# Patient Record
Sex: FEMALE | Race: WHITE | NOT HISPANIC OR LATINO | Employment: STUDENT | URBAN - METROPOLITAN AREA
[De-identification: names, ages, dates, MRNs, and addresses within clinical notes are randomized per-mention and may not be internally consistent; named-entity substitution may affect disease eponyms.]

---
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
## 2024-06-17 ENCOUNTER — LAB (OUTPATIENT)
Dept: LAB | Facility: CLINIC | Age: 19
End: 2024-06-17
Payer: COMMERCIAL

## 2024-06-17 ENCOUNTER — OFFICE VISIT (OUTPATIENT)
Dept: RHEUMATOLOGY | Facility: CLINIC | Age: 19
End: 2024-06-17
Attending: PEDIATRICS
Payer: COMMERCIAL

## 2024-06-17 VITALS
WEIGHT: 179.23 LBS | DIASTOLIC BLOOD PRESSURE: 78 MMHG | BODY MASS INDEX: 28.13 KG/M2 | HEIGHT: 67 IN | SYSTOLIC BLOOD PRESSURE: 116 MMHG

## 2024-06-17 DIAGNOSIS — M32.19 OTHER SYSTEMIC LUPUS ERYTHEMATOSUS WITH OTHER ORGAN INVOLVEMENT (H): Chronic | ICD-10-CM

## 2024-06-17 DIAGNOSIS — Z79.52 CURRENT CHRONIC USE OF SYSTEMIC STEROIDS: ICD-10-CM

## 2024-06-17 DIAGNOSIS — M32.14 LUPUS NEPHRITIS (H): ICD-10-CM

## 2024-06-17 DIAGNOSIS — D84.9 IMMUNOSUPPRESSED STATUS (H): ICD-10-CM

## 2024-06-17 DIAGNOSIS — Z13.5 SCREENING FOR EYE CONDITION: ICD-10-CM

## 2024-06-17 LAB
ALBUMIN SERPL BCG-MCNC: 4.4 G/DL (ref 3.5–5.2)
ALP SERPL-CCNC: 71 U/L (ref 40–150)
ALT SERPL W P-5'-P-CCNC: 16 U/L (ref 0–50)
ANION GAP SERPL CALCULATED.3IONS-SCNC: 9 MMOL/L (ref 7–15)
AST SERPL W P-5'-P-CCNC: 20 U/L (ref 0–45)
BASOPHILS # BLD AUTO: 0 10E3/UL (ref 0–0.2)
BASOPHILS NFR BLD AUTO: 0 %
BILIRUB SERPL-MCNC: 0.2 MG/DL
BUN SERPL-MCNC: 17.2 MG/DL (ref 6–20)
CALCIUM SERPL-MCNC: 9.1 MG/DL (ref 8.6–10)
CHLORIDE SERPL-SCNC: 108 MMOL/L (ref 98–107)
CREAT SERPL-MCNC: 0.92 MG/DL (ref 0.51–0.95)
DEPRECATED HCO3 PLAS-SCNC: 21 MMOL/L (ref 22–29)
EGFRCR SERPLBLD CKD-EPI 2021: >90 ML/MIN/1.73M2
EOSINOPHIL # BLD AUTO: 0.1 10E3/UL (ref 0–0.7)
EOSINOPHIL NFR BLD AUTO: 2 %
ERYTHROCYTE [DISTWIDTH] IN BLOOD BY AUTOMATED COUNT: 12.7 % (ref 10–15)
GLUCOSE SERPL-MCNC: 91 MG/DL (ref 70–99)
HCT VFR BLD AUTO: 41.2 % (ref 35–47)
HGB BLD-MCNC: 12.9 G/DL (ref 11.7–15.7)
IMM GRANULOCYTES # BLD: 0 10E3/UL
IMM GRANULOCYTES NFR BLD: 0 %
LYMPHOCYTES # BLD AUTO: 2.7 10E3/UL (ref 0.8–5.3)
LYMPHOCYTES NFR BLD AUTO: 51 %
MCH RBC QN AUTO: 29.1 PG (ref 26.5–33)
MCHC RBC AUTO-ENTMCNC: 31.3 G/DL (ref 31.5–36.5)
MCV RBC AUTO: 93 FL (ref 78–100)
MONOCYTES # BLD AUTO: 0.4 10E3/UL (ref 0–1.3)
MONOCYTES NFR BLD AUTO: 8 %
NEUTROPHILS # BLD AUTO: 2 10E3/UL (ref 1.6–8.3)
NEUTROPHILS NFR BLD AUTO: 38 %
PLATELET # BLD AUTO: 204 10E3/UL (ref 150–450)
POTASSIUM SERPL-SCNC: 4.4 MMOL/L (ref 3.4–5.3)
PROT SERPL-MCNC: 7 G/DL (ref 6.4–8.3)
RBC # BLD AUTO: 4.44 10E6/UL (ref 3.8–5.2)
SODIUM SERPL-SCNC: 138 MMOL/L (ref 135–145)
WBC # BLD AUTO: 5.2 10E3/UL (ref 4–11)

## 2024-06-17 PROCEDURE — G2211 COMPLEX E/M VISIT ADD ON: HCPCS | Performed by: PEDIATRICS

## 2024-06-17 PROCEDURE — 99214 OFFICE O/P EST MOD 30 MIN: CPT | Performed by: PEDIATRICS

## 2024-06-17 PROCEDURE — 86160 COMPLEMENT ANTIGEN: CPT

## 2024-06-17 PROCEDURE — 85025 COMPLETE CBC W/AUTO DIFF WBC: CPT

## 2024-06-17 PROCEDURE — 36415 COLL VENOUS BLD VENIPUNCTURE: CPT

## 2024-06-17 PROCEDURE — 80053 COMPREHEN METABOLIC PANEL: CPT

## 2024-06-17 PROCEDURE — 86225 DNA ANTIBODY NATIVE: CPT

## 2024-06-17 PROCEDURE — 99213 OFFICE O/P EST LOW 20 MIN: CPT | Performed by: PEDIATRICS

## 2024-06-17 RX ORDER — HYDROXYCHLOROQUINE SULFATE 200 MG/1
400 TABLET, FILM COATED ORAL DAILY
Qty: 180 TABLET | Refills: 4 | Status: SHIPPED | OUTPATIENT
Start: 2024-06-17

## 2024-06-17 RX ORDER — MYCOPHENOLATE MOFETIL 500 MG/1
1000 TABLET ORAL 2 TIMES DAILY
Qty: 360 TABLET | Refills: 4 | Status: SHIPPED | OUTPATIENT
Start: 2024-06-17

## 2024-06-17 NOTE — NURSING NOTE
"Informant-    Julissa is accompanied by mother and father    Reason for Visit-  Follow up    Vitals signs-  /78   Ht 1.712 m (5' 7.4\")   Wt 81.3 kg (179 lb 3.7 oz)   BMI 27.74 kg/m      There are concerns about the child's exposure to violence in the home: No    Need Flu Shot: No    Need MyChart: No    Does the patient need any medication refills today? No    Face to Face time: 5 Minutes  Sammi SY MA      "

## 2024-06-17 NOTE — PATIENT INSTRUCTIONS
Precautions:   Immune Suppression: Routine care for infections and fevers. For fever illness with rash or an illness requiring emergency department or hospital visit, please call our office for advice. No live vaccinations, such as measles mumps rubella (MMR), varicella chickenpox, and intranasal influenza. Inactivated seasonal influenza vaccination is recommended as this patient is in the high-risk group for influenza.    For Patient Education Materials:  [email or web address]/[employer]han [employer]: We encourage you to sign up for [employer] at [email or web address]. For assistance or questions, call [phone]. If your child is 12 years or older, a consent for proxy/parent access needs to be signed so please discuss this with your physician at the next visit.  [facility] [place] Nurse Coordinators: [phone]  help with questions about your child's rheumatic condition, medications, scheduling infusions and test results.    After Hours/Paging : [phone]  For urgent issues after hours or on the weekends, please call the page  ask to speak to the physician on-call for Pediatric Rheumatology. Please do not use [employer] for urgent requests.    Infusions in [place] at [facility]: [phone] - Please try to schedule infusions at least 2 months in advance. Please try to give us 72 hours or longer notice if you need to cancel infusions so other patients can benefit from this opening. Note: Insurance authorization must be obtained before any infusion can be scheduled. If you change health insurance, you must notify our office as soon as possible, so that the infusion can be reauthorized.    [phone],  Main  Services:  Singaporean: [phone], Greenlandic: [phone], Hmong/Sinhala/[place]win: [phone]

## 2024-06-17 NOTE — LETTER
6/17/2024      RE: Julissa Contreras  [address] [facility] [address]     Dear Colleague,    Thank you for the opportunity to participate in the care of your patient, Julissa Contreras, at the [facility] [place] at [facility]. Please see a copy of my visit note below.        [facility] [place]  [address]  [facility] [address]  Phone: [phone]  Fax: [phone]    Patient:  Julissa Contreras, Date of birth 2005  Date of Visit:  06/17/2024  Referring Provider Laquita Chavez    Patient Active Problem List   Diagnosis    Inflammatory arthritis    Other forms of systemic lupus erythematosus (H)    Screening for eye condition    Immunosuppressed status (H24)    Lupus nephritis (H)    Family history of clotting disorder    CHILANGO-1 4G/5G genotype          Rheumatology History:     Julissa was first seen in clinic for evaluation of joint pain and stiffness on 3/27/2019. She had signs of inflammatory arthritis of the right hand, malar rash, and erythematous lesions on the hard palate. Multiple laboratory tests were ordered to help clarify a diagnosis, results were as follows: OH positive, double-stranded DNA antibody positive, See antibody positive and mild positive RNP, positive for hematuria.   4/1/19: Discussed the diagnosis of systemic lupus erythematosus. Started mycophenolate 1000 mg twice daily, hydroxychloroquine 400 mg daily and prednisone 40 mg twice daily in addition to IV methylprednisolone daily for 3 days and then weekly for 4 weeks. Started PJP prophylaxis with bactrim.   4/15/19: Significant improvement in symptoms, continued treatment plan.   5/13/19: Lupus clinically and serologically inactive. Continued to wean prednisone.   6/24/19: Continued to wean prednisone with full discontinuation in July.  8/12/19: Doing well, but hematuria still  present as well as slightly higher protein creatinine ratio. Positive rheumatoid factor which was subsequently negative on repeat testing. Complement and double-stranded DNA antibodies remained normal. Continued with treatment plan.   10/14/19: Doing well off steroids. Continued hematuria and slightly higher protein creatinine ratio. Discussed potential addition of a urinary myoglobin to a future urinalysis.  2/17/20: Lupus in good control. Recommended no changes to treatment plan.  5/11/20: Recent renal biopsy showed active lupus nephritis, mixed class III and class V. restarted corticosteroids. Completed further laboratory testing and discussed potential addition of lisinopril to treatment plan.  9/14/2020.  discussed mild anemia but her lupus overall was doing very well  1/4/2021: She had seen endocrinology who obtained testing for bone health and adrenal insufficiency.  No changes lupus medications.   7/12/2021: No changes made to her treatment plan.  I recommended monitoring every 4 months unless her prednisone is weaned to 0 by nephrology in which case I recommend laboratory tests every 2 months  Eye examination: Screening for eye changes related to hydroxychloroquine should begin after 5 years of use which would be starting January 2024.  Then every year thereafter if she has continuous use..  12/27/2021: Prednisone 5 mg every other day with plans for monthly urinalyses and urine albumin.  Increase glucose monitoring to every other month.  6/20/2022: No active lupus features. Prednisone discontinued February 2022 by nephrology. Recommended monthly urine testing.   12/19/2022: No active lupus.  Infectious screening and immunizations:   Hepatitis B Core K[place]um   Date Value Ref Range Status   04/01/2019 Nonreactive NR^Nonreactive Final     Hepatitis C Antibody   Date Value Ref Range Status   04/01/2019 Nonreactive NR^Nonreactive Final     Comment:     Assay performance characteristics have not been established for  newborns,   infants, and children       Quantiferon-TB Gold Plus Result   Date Value Ref Range Status   04/01/2019 Negative NEG^Negative Final     Comment:     No interferon gamma response to M.tuberculosis antigens was detected.   Infection with M.tuberculosis is unlikely, however a single negative result   does not exclude infection. In patients at high risk for infection, a second   test should be considered  in accordance with the 2017 ATS/IDSA/CDC Clinical Practice Guidelines for   Diagnosis of Tuberculosis in Adults and Children [Bree HIGGINS et   al.Clin.Infect.Dis. 2017 64(2):111-115].            Subjective:   Julissa is a 19 year old female who was seen in Pediatric Rheumatology clinic today for a follow-up visit accompanied today by both parents.  Julissa was last seen in our clinic on 6/19/2023: No signs of active lupus though she continued to have improvement in her urinary protein to creatinine ratio.  We recommended evaluation every 4 to 6 months with laboratory testing and alternating visits with rheumatology and nephrology.  We discussed hydroxychloroquine use and need for formal examination after 5 years of continuous use.    6/17/2024: Family has no particular concerns today.  Julissa is doing quite well, she is remembering all of her medications.  School is going well.  We discussed transition to adult rheumatology.          Allergies:     No Known Allergies       Medications:     Current Outpatient Medications   Medication Sig Dispense Refill    cholecalciferol 50 MCG (2000 UT) tablet Take 1 tablet (50 mcg) by mouth daily 60 tablet 0    hydroxychloroquine (PLAQUENIL) 200 MG tablet Take 2 tablets (400 mg) by mouth daily 180 tablet 4    mycophenolate (GENERIC EQUIVALENT) 500 MG tablet Take 2 tablets (1,000 mg) by mouth 2 times daily 360 tablet 4     No current facility-administered medications for this visit.      No current facility-administered medications for this visit.        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  Elevated CHILANGO-1 levels are associated with an increased risk of CAD and VTE     Past Surgical History:   Procedure Laterality Date    HC BIOPSY RENAL, PERCUTANEOUS  3/3/2020         HC TOOTH EXTRACTION W/FORCEP Bilateral 08/2018    PERCUTANEOUS BIOPSY KIDNEY N/A 3/3/2020    Procedure: Percutaneous Native Kidney Biopsy;  Surgeon: Julissa Cornejo MD;  [facility]      Family History   Problem Relation Age of Onset    Nephrolithiasis Mother     Osteoporosis Maternal Grandmother     Hyperlipidemia Maternal Grandmother     Heart Disease Maternal Grandfather     Pancreatic Cancer Paternal Grandmother     Lymphoma Other     Factor V Leiden deficiency Maternal Aunt     Pancreatic Cancer Paternal Great-Grandmother     Lung Cancer Paternal Grandfather     Rheumatic fever Paternal Grandfather     Fibroids Paternal Aunt     Cancer Maternal Great-Grandfather     Kidney Disease No family hx of      Social History     Social History Narrative    She lives at home with mom and dad. Older sister is in college in [place]. No pets.     Plays soccer, basketball, and volleyball.     7/2021She is thinking going to college in the [employer] and is likely going to study computer science or game theory          Examination:   Blood pressure 116/78, height 1.712 m (5' 7.4\"), weight 81.3 kg (179 lb 3.7 oz), not currently breastfeeding.  Facility age limit for growth %jesus is 20 years.  Growth %ile SmartLinks can only be used for patients less than 20 years old.  Body surface area is 1.97 meters squared.     Constitutional: alert, no distress and cooperative  Head and Eyes: No alopecia, " PEERL, conjunctiva clear  ENT: mucous membranes moist, healthy appearing dentition, no intraoral ulcers and no intranasal ulcers  Neck: Neck supple. No lymphadenopathy. Thyroid symmetric, normal size,  Respiratory: negative, clear to auscultation  Cardiovascular: negative, RRR. No murmurs, no rubs  Gastrointestinal: Abdomen soft, non-tender., No masses, No hepatosplenomegaly  : Deferred  Neurologic: Gait normal.  Sensation grossly normal.  Psychiatric: mentation appears normal and affect normal  Hematologic/Lymphatic/Immunologic: Normal cervical, axillary lymph nodes  Skin: no rashes  Musculoskeletal: gait normal, extremities warm, well perfused. Detailed musculoskeletal exam was performed, normal muscle strength of trunk, upper and lower extremities and no sign of swelling, tenderness at joints or entheses, or decreased ROM unless otherwise noted below.                Last Imaging Results:     Results for orders placed or performed during the hospital encounter of 03/03/20   US Guided Needle Placement ([facility] Only)    Narrative    This exam was marked as non-reportable because it will not be read by a   radiologist or a [facility] non-radiologist provider.                    Last Lab Results:     Lab on 06/17/2024   Component Date Value    WBC Count 06/17/2024 5.2     RBC Count 06/17/2024 4.44     Hemoglobin 06/17/2024 12.9     Hematocrit 06/17/2024 41.2     MCV 06/17/2024 93     MCH 06/17/2024 29.1     MCHC 06/17/2024 31.3 (L)     RDW 06/17/2024 12.7     Platelet Count 06/17/2024 204     % Neutrophils 06/17/2024 38     % Lymphocytes 06/17/2024 51     % Monocytes 06/17/2024 8     % Eosinophils 06/17/2024 2     % Basophils 06/17/2024 0     % Immature Granulocytes 06/17/2024 0     Absolute Neutrophils 06/17/2024 2.0     Absolute Lymphocytes 06/17/2024 2.7     Absolute Monocytes 06/17/2024 0.4     Absolute Eosinophils 06/17/2024 0.1     Absolute Basophils 06/17/2024 0.0     Absolute Immature Granul* 06/17/2024 0.0            Assessment :        Other systemic lupus erythematosus with other organ involvement (H)  Screening for eye condition  Immunosuppressed status (H24)  Current chronic use of systemic steroids    Julissa has no sign of active lupus today based on clinical findings and most of her labs are returned at this time and look quite good.  I look forward to seeing the rest of the results.  Today we discussed transition to adult otology.  Will postpone that for 1 year given that she is still in the midst of finishing college.  Family will contact me though and I think the best option family will contact me for advice when that time comes if they decide to do it before I see her next.  The best option I think is for her to find an rheumatologist in [place] where she plans to stay.         Recommendations and follow-up:     Continue current treatment    Laboratory, Radiology, Referrals: Routine laboratory testing every 4-6 months for lupus monitoring.       No orders of the defined types were placed in this encounter.    Ophthalmology examination: MREYEFREQ: for hydroxchloroquine use, comprehensive eye exam with visual field and OCT, baseline then every 5 years.     Precautions:   Immune Suppression: Routine care for infections and fevers. For fever illness with rash or an illness requiring emergency department or hospital visit, please call our office for advice. No live vaccinations, such as measles mumps rubella (MMR), varicella chickenpox, and intranasal influenza. Inactivated seasonal influenza vaccination is recommended as this patient is in the high-risk group for influenza.  Pregnancy: this patient is on medications that can cause pregnancy loss or birth defects. Patient was cautioned to avoid pregnancy, to hold all medications if she thinks she is pregnant and to notify our office immediately.  Sun Exposure: This patient's medication(s) and/or condition make them sun sensitive, causing skin rash or flare of  symptoms. Sun avoidance and physical and chemical sunblocks are recommended.     Return visit: Return in about 1 year (around 6/17/2025).     If there are any new questions or concerns, I would be glad to help and can be reached through our main office at [phone] or our paging  at [phone].    Lovely Ward MD, MS   of Pediatrics  Pediatric Rheumatology  [facility]    Review of external notes as documented elsewhere in note  Review of the result(s) of each unique test - previous testing  Assessment requiring an independent historian(s) - family - parents  Ordering of each unique test  Prescription drug management  I spent a total of 37 minutes on the day of the visit.   Time spent by me doing chart review, history and exam, documentation and further activities per the note      The longitudinal plan of care for the diagnosis(es)/condition(s) as documented were addressed during this visit. Due to the added complexity in care, I will continue to support Julissa  in the subsequent management and with ongoing continuity of care.    CC  Patient Care Team:  Eleonora Saenz MD as PCP - General (Pediatrics)  Lovely Ward MD as MD (Pediatric Rheumatology)  Segundo Ferrell PA-C Uttley, Scott Arthur, MD as MD (Ophthalmology)  Eleonora Saenz MD as Assigned PCP  Norbert Duffy MD as MD (Pediatric Nephrology)  Lovely Ward MD as Assigned Pediatric Specialist Provider  LOVELY WARD    Copy to patient  Radha Contreras [place]  [address] [facility] [address]

## 2024-06-17 NOTE — PROGRESS NOTES
[facility] PEDIATRIC SPECIALTY CLINIC [place] [address] [address]  [facility] [address]  Phone: [phone]  Fax: [phone]    Patient:  Julissa Contreras, Date of birth 2005  Date of Visit:  06/17/2024  Referring Provider Laquita Chavez    Patient Active Problem List   Diagnosis    Inflammatory arthritis    Other forms of systemic lupus erythematosus (H)    Screening for eye condition    Immunosuppressed status (H24)    Lupus nephritis (H)    Family history of clotting disorder    CHILANGO-1 4G/5G genotype          Rheumatology History:     Julissa was first seen in clinic for evaluation of joint pain and stiffness on 3/27/2019. She had signs of inflammatory arthritis of the right hand, malar rash, and erythematous lesions on the hard palate. Multiple laboratory tests were ordered to help clarify a diagnosis, results were as follows: OH positive, double-stranded DNA antibody positive, See antibody positive and mild positive RNP, positive for hematuria.   4/1/19: Discussed the diagnosis of systemic lupus erythematosus. Started mycophenolate 1000 mg twice daily, hydroxychloroquine 400 mg daily and prednisone 40 mg twice daily in addition to IV methylprednisolone daily for 3 days and then weekly for 4 weeks. Started PJP prophylaxis with bactrim.   4/15/19: Significant improvement in symptoms, continued treatment plan.   5/13/19: Lupus clinically and serologically inactive. Continued to wean prednisone.   6/24/19: Continued to wean prednisone with full discontinuation in July.  8/12/19: Doing well, but hematuria still present as well as slightly higher protein creatinine ratio. Positive rheumatoid factor which was subsequently negative on repeat testing. Complement and double-stranded DNA antibodies remained normal. Continued with treatment plan.   10/14/19: Doing well off steroids. Continued hematuria and slightly higher protein creatinine ratio. Discussed potential addition of a  urinary myoglobin to a future urinalysis.  2/17/20: Lupus in good control. Recommended no changes to treatment plan.  5/11/20: Recent renal biopsy showed active lupus nephritis, mixed class III and class V. restarted corticosteroids. Completed further laboratory testing and discussed potential addition of lisinopril to treatment plan.  9/14/2020.  discussed mild anemia but her lupus overall was doing very well  1/4/2021: She had seen endocrinology who obtained testing for bone health and adrenal insufficiency.  No changes lupus medications.   7/12/2021: No changes made to her treatment plan.  I recommended monitoring every 4 months unless her prednisone is weaned to 0 by nephrology in which case I recommend laboratory tests every 2 months  Eye examination: Screening for eye changes related to hydroxychloroquine should begin after 5 years of use which would be starting January 2024.  Then every year thereafter if she has continuous use..  12/27/2021: Prednisone 5 mg every other day with plans for monthly urinalyses and urine albumin.  Increase glucose monitoring to every other month.  6/20/2022: No active lupus features. Prednisone discontinued February 2022 by nephrology. Recommended monthly urine testing.   12/19/2022: No active lupus.  Infectious screening and immunizations:   Hepatitis B Core Kusum   Date Value Ref Range Status   04/01/2019 Nonreactive NR^Nonreactive Final     Hepatitis C Antibody   Date Value Ref Range Status   04/01/2019 Nonreactive NR^Nonreactive Final     Comment:     Assay performance characteristics have not been established for newborns,   infants, and children       Quantiferon-TB Gold Plus Result   Date Value Ref Range Status   04/01/2019 Negative NEG^Negative Final     Comment:     No interferon gamma response to M.tuberculosis antigens was detected.   Infection with M.tuberculosis is unlikely, however a single negative result   does not exclude infection. In patients at high risk for  infection, a second   test should be considered  in accordance with the 2017 ATS/IDSA/CDC Clinical Practice Guidelines for   Diagnosis of Tuberculosis in Adults and Children [Bree HIGGINS et   al.Clin.Infect.Dis. 2017 64(2):111-115].            Subjective:   Julissa is a 19 year old female who was seen in Pediatric Rheumatology clinic today for a follow-up visit accompanied today by both parents.  Julissa was last seen in our clinic on 6/19/2023: No signs of active lupus though she continued to have improvement in her urinary protein to creatinine ratio.  We recommended evaluation every 4 to 6 months with laboratory testing and alternating visits with rheumatology and nephrology.  We discussed hydroxychloroquine use and need for formal examination after 5 years of continuous use.    6/17/2024: Family has no particular concerns today.  Julissa is doing quite well, she is remembering all of her medications.  School is going well.  We discussed transition to adult rheumatology.          Allergies:     No Known Allergies       Medications:     Current Outpatient Medications   Medication Sig Dispense Refill    cholecalciferol 50 MCG (2000 UT) tablet Take 1 tablet (50 mcg) by mouth daily 60 tablet 0    hydroxychloroquine (PLAQUENIL) 200 MG tablet Take 2 tablets (400 mg) by mouth daily 180 tablet 4    mycophenolate (GENERIC EQUIVALENT) 500 MG tablet Take 2 tablets (1,000 mg) by mouth 2 times daily 360 tablet 4     No current facility-administered medications for this visit.      No current facility-administered medications for this visit.        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  Elevated CHILANGO-1 levels  "are associated with an increased risk of CAD and VTE     Past Surgical History:   Procedure Laterality Date    HC BIOPSY RENAL, PERCUTANEOUS  3/3/2020         HC TOOTH EXTRACTION W/FORCEP Bilateral 08/2018    PERCUTANEOUS BIOPSY KIDNEY N/A 3/3/2020    Procedure: Percutaneous Native Kidney Biopsy;  Surgeon: Julissa Cornejo MD;  Location: [facility]      Family History   Problem Relation Age of Onset    Nephrolithiasis Mother     Osteoporosis Maternal Grandmother     Hyperlipidemia Maternal Grandmother     Heart Disease Maternal Grandfather     Pancreatic Cancer Paternal Grandmother     Lymphoma Other     Factor V Leiden deficiency Maternal Aunt     Pancreatic Cancer Paternal Great-Grandmother     Lung Cancer Paternal Grandfather     Rheumatic fever Paternal Grandfather     Fibroids Paternal Aunt     Cancer Maternal Great-Grandfather     Kidney Disease No family hx of      Social History     Social History Narrative    She lives at home with mom and dad. Older sister is in college in [place]. No pets.     Plays soccer, basketball, and volleyball.     7/2021She is thinking going to college in the [employer] and is likely going to study computer science or game theory          Examination:   Blood pressure 116/78, height 1.712 m (5' 7.4\"), weight 81.3 kg (179 lb 3.7 oz), not currently breastfeeding.  Facility age limit for growth %jesus is 20 years.  Growth %ile SmartLinks can only be used for patients less than 20 years old.  Body surface area is 1.97 meters squared.     Constitutional: alert, no distress and cooperative  Head and Eyes: No alopecia, PEERL, conjunctiva clear  ENT: mucous membranes moist, healthy appearing dentition, no intraoral ulcers and no intranasal ulcers  Neck: Neck supple. No lymphadenopathy. Thyroid symmetric, normal size,  Respiratory: negative, clear to auscultation  Cardiovascular: negative, RRR. No murmurs, no rubs  Gastrointestinal: Abdomen soft, non-tender., No masses, No " hepatosplenomegaly  : Deferred  Neurologic: Gait normal.  Sensation grossly normal.  Psychiatric: mentation appears normal and affect normal  Hematologic/Lymphatic/Immunologic: Normal cervical, axillary lymph nodes  Skin: no rashes  Musculoskeletal: gait normal, extremities warm, well perfused. Detailed musculoskeletal exam was performed, normal muscle strength of trunk, upper and lower extremities and no sign of swelling, tenderness at joints or entheses, or decreased ROM unless otherwise noted below.                Last Imaging Results:     Results for orders placed or performed during the hospital encounter of 03/03/20   US Guided Needle Placement ([facility] Only)    Narrative    This exam was marked as non-reportable because it will not be read by a   radiologist or a [facility] non-radiologist provider.                    Last Lab Results:     Lab on 06/17/2024   Component Date Value    WBC Count 06/17/2024 5.2     RBC Count 06/17/2024 4.44     Hemoglobin 06/17/2024 12.9     Hematocrit 06/17/2024 41.2     MCV 06/17/2024 93     MCH 06/17/2024 29.1     MCHC 06/17/2024 31.3 (L)     RDW 06/17/2024 12.7     Platelet Count 06/17/2024 204     % Neutrophils 06/17/2024 38     % Lymphocytes 06/17/2024 51     % Monocytes 06/17/2024 8     % Eosinophils 06/17/2024 2     % Basophils 06/17/2024 0     % Immature Granulocytes 06/17/2024 0     Absolute Neutrophils 06/17/2024 2.0     Absolute Lymphocytes 06/17/2024 2.7     Absolute Monocytes 06/17/2024 0.4     Absolute Eosinophils 06/17/2024 0.1     Absolute Basophils 06/17/2024 0.0     Absolute Immature Granul* 06/17/2024 0.0           Assessment :        Other systemic lupus erythematosus with other organ involvement (H)  Screening for eye condition  Immunosuppressed status (H24)  Current chronic use of systemic steroids    Julissa has no sign of active lupus today based on clinical findings and most of her labs are returned at this time and look quite good.  I look forward to  seeing the rest of the results.  Today we discussed transition to adult otology.  Will postpone that for 1 year given that she is still in the midst of finishing college.  Family will contact me though and I think the best option family will contact me for advice when that time comes if they decide to do it before I see her next.  The best option I think is for her to find an rheumatologist in [place] where she plans to stay.         Recommendations and follow-up:     Continue current treatment    Laboratory, Radiology, Referrals: Routine laboratory testing every 4-6 months for lupus monitoring.       No orders of the defined types were placed in this encounter.    Ophthalmology examination: MREYEFREQ: for hydroxchloroquine use, comprehensive eye exam with visual field and OCT, baseline then every 5 years.     Precautions:   Immune Suppression: Routine care for infections and fevers. For fever illness with rash or an illness requiring emergency department or hospital visit, please call our office for advice. No live vaccinations, such as measles mumps rubella (MMR), varicella chickenpox, and intranasal influenza. Inactivated seasonal influenza vaccination is recommended as this patient is in the high-risk group for influenza.  Pregnancy: this patient is on medications that can cause pregnancy loss or birth defects. Patient was cautioned to avoid pregnancy, to hold all medications if she thinks she is pregnant and to notify our office immediately.  Sun Exposure: This patient's medication(s) and/or condition make them sun sensitive, causing skin rash or flare of symptoms. Sun avoidance and physical and chemical sunblocks are recommended.     Return visit: Return in about 1 year (around 6/17/2025).     If there are any new questions or concerns, I would be glad to help and can be reached through our main office at [phone] or our paging  at [phone].    Laquita Chavez MD, MS    of Pediatrics  Pediatric Rheumatology  [facility]    Review of external notes as documented elsewhere in note  Review of the result(s) of each unique test - previous testing  Assessment requiring an independent historian(s) - family - parents  Ordering of each unique test  Prescription drug management  I spent a total of 37 minutes on the day of the visit.   Time spent by me doing chart review, history and exam, documentation and further activities per the note      The longitudinal plan of care for the diagnosis(es)/condition(s) as documented were addressed during this visit. Due to the added complexity in care, I will continue to support Julissa  in the subsequent management and with ongoing continuity of care.    CC  Patient Care Team:  Eleonora Saenz MD as PCP - General (Pediatrics)  Lovely Ward MD as MD (Pediatric Rheumatology)  Segundo Ferrell PA-C Uttley, Scott Arthur, MD as MD (Ophthalmology)  Eleonora Saenz MD as Assigned PCP  Norbert Duffy MD as MD (Pediatric Nephrology)  Lovely Ward MD as Assigned Pediatric Specialist Provider  LOVELY WARD    Copy to patient  Radha Contreras James  [address] [facility] [address]

## 2024-06-18 LAB
C3 SERPL-MCNC: 102 MG/DL (ref 81–157)
C4 SERPL-MCNC: 14 MG/DL (ref 13–39)
DSDNA AB SER-ACNC: 16 IU/ML

## 2024-06-19 ENCOUNTER — LAB REQUISITION (OUTPATIENT)
Dept: LAB | Facility: CLINIC | Age: 19
End: 2024-06-19
Payer: COMMERCIAL

## 2024-06-19 DIAGNOSIS — L91.0 HYPERTROPHIC SCAR: ICD-10-CM

## 2024-06-19 PROCEDURE — 88305 TISSUE EXAM BY PATHOLOGIST: CPT | Mod: TC,ORL | Performed by: DERMATOLOGY

## 2024-06-19 PROCEDURE — 88305 TISSUE EXAM BY PATHOLOGIST: CPT | Mod: 26 | Performed by: DERMATOLOGY

## 2024-06-21 LAB
PATH REPORT.COMMENTS IMP SPEC: NORMAL
PATH REPORT.COMMENTS IMP SPEC: NORMAL
PATH REPORT.FINAL DX SPEC: NORMAL
PATH REPORT.GROSS SPEC: NORMAL
PATH REPORT.MICROSCOPIC SPEC OTHER STN: NORMAL
PATH REPORT.RELEVANT HX SPEC: NORMAL

## 2024-06-25 ENCOUNTER — OFFICE VISIT (OUTPATIENT)
Dept: PEDIATRICS | Facility: CLINIC | Age: 19
End: 2024-06-25
Payer: COMMERCIAL

## 2024-06-25 VITALS
WEIGHT: 176.7 LBS | TEMPERATURE: 97.8 F | HEART RATE: 63 BPM | SYSTOLIC BLOOD PRESSURE: 102 MMHG | HEIGHT: 68 IN | OXYGEN SATURATION: 100 % | BODY MASS INDEX: 26.78 KG/M2 | RESPIRATION RATE: 18 BRPM | DIASTOLIC BLOOD PRESSURE: 70 MMHG

## 2024-06-25 DIAGNOSIS — M19.90 INFLAMMATORY ARTHRITIS: ICD-10-CM

## 2024-06-25 DIAGNOSIS — Z00.00 ROUTINE GENERAL MEDICAL EXAMINATION AT A HEALTH CARE FACILITY: Primary | ICD-10-CM

## 2024-06-25 DIAGNOSIS — Z83.2 FAMILY HISTORY OF CLOTTING DISORDER: ICD-10-CM

## 2024-06-25 DIAGNOSIS — Z15.89 PAI-1 4G/5G GENOTYPE: ICD-10-CM

## 2024-06-25 DIAGNOSIS — D84.9 IMMUNOSUPPRESSED STATUS (H): ICD-10-CM

## 2024-06-25 DIAGNOSIS — M32.14 LUPUS NEPHRITIS (H): ICD-10-CM

## 2024-06-25 LAB
ALBUMIN UR-MCNC: NEGATIVE MG/DL
APPEARANCE UR: CLEAR
BACTERIA #/AREA URNS HPF: ABNORMAL /HPF
BILIRUB UR QL STRIP: NEGATIVE
COLOR UR AUTO: YELLOW
GLUCOSE UR STRIP-MCNC: NEGATIVE MG/DL
HGB UR QL STRIP: NEGATIVE
KETONES UR STRIP-MCNC: NEGATIVE MG/DL
LEUKOCYTE ESTERASE UR QL STRIP: NEGATIVE
MUCOUS THREADS #/AREA URNS LPF: PRESENT /LPF
NITRATE UR QL: NEGATIVE
PH UR STRIP: 6 [PH] (ref 5–7)
RBC #/AREA URNS AUTO: ABNORMAL /HPF
SP GR UR STRIP: >=1.03 (ref 1–1.03)
SQUAMOUS #/AREA URNS AUTO: ABNORMAL /LPF
UROBILINOGEN UR STRIP-ACNC: 0.2 E.U./DL
WBC #/AREA URNS AUTO: ABNORMAL /HPF

## 2024-06-25 PROCEDURE — 99395 PREV VISIT EST AGE 18-39: CPT | Performed by: PEDIATRICS

## 2024-06-25 PROCEDURE — 84156 ASSAY OF PROTEIN URINE: CPT

## 2024-06-25 PROCEDURE — 81001 URINALYSIS AUTO W/SCOPE: CPT

## 2024-06-25 SDOH — HEALTH STABILITY: PHYSICAL HEALTH: ON AVERAGE, HOW MANY MINUTES DO YOU ENGAGE IN EXERCISE AT THIS LEVEL?: 70 MIN

## 2024-06-25 SDOH — HEALTH STABILITY: PHYSICAL HEALTH: ON AVERAGE, HOW MANY DAYS PER WEEK DO YOU ENGAGE IN MODERATE TO STRENUOUS EXERCISE (LIKE A BRISK WALK)?: 5 DAYS

## 2024-06-25 ASSESSMENT — SOCIAL DETERMINANTS OF HEALTH (SDOH): HOW OFTEN DO YOU GET TOGETHER WITH FRIENDS OR RELATIVES?: MORE THAN THREE TIMES A WEEK

## 2024-06-25 ASSESSMENT — PAIN SCALES - GENERAL: PAINLEVEL: NO PAIN (0)

## 2024-06-25 NOTE — PATIENT INSTRUCTIONS
"Patient Education   Preventive Care Advice   This is general advice we often give to help people stay healthy. Your care team may have specific advice just for you. Please talk to your care team about your own preventive care needs.  Lifestyle  Exercise at least 150 minutes each week (30 minutes a day, 5 days a week).  Do muscle strengthening activities 2 days a week. These help control your weight and prevent disease.  No smoking.  Wear sunscreen to prevent skin cancer.  Have your home tested for radon every 2 to 5 years. Radon is a colorless, odorless gas that can harm your lungs. To learn more, go to www.health.[facility].mn.us and search for \"Radon in Homes.\"  Keep guns unloaded and locked up in a safe place like a safe or gun vault, or, use a gun lock and hide the keys. Always lock away bullets separately. To learn more, visit [employer].mn.gov and search for \"safe gun storage.\"  Nutrition  Eat 5 or more servings of fruits and vegetables each day.  Try wheat bread, brown rice and whole grain pasta (instead of white bread, rice, and pasta).  Get enough calcium and vitamin D. Check the label on foods and aim for 100% of the RDA (recommended daily allowance).  Regular exams  Have a dental exam and cleaning every 6 months.  See your health care team every year to talk about:  Any changes in your health.  Any medicines your care team has prescribed.  Preventive care, family planning, and ways to prevent chronic diseases.  Shots (vaccines)   HPV shots (up to age 26), if you've never had them before.  Hepatitis B shots (up to age 59), if you've never had them before.  COVID-19 shot: Get this shot when it's due.  Flu shot: Get a flu shot every year.  Tetanus shot: Get a tetanus shot every 10 years.  Pneumococcal, hepatitis A, and RSV shots: Ask your care team if you need these based on your risk.  Shingles shot (for age 50 and up).  General health tests  Diabetes screening:  Starting at age 35, Get screened for diabetes at least " every 3 years.  If you are younger than age 35, ask your care team if you should be screened for diabetes.  Cholesterol test: At age 39, start having a cholesterol test every 5 years, or more often if advised.  Bone density scan (DEXA): At age 50, ask your care team if you should have this scan for osteoporosis (brittle bones).  Hepatitis C: Get tested at least once in your life.  Abdominal aortic aneurysm screening: Talk to your doctor about having this screening if you:  Have ever smoked; and  Are biologically male; and  Are between the ages of 65 and 75.  STIs (sexually transmitted infections)  Before age 24: Ask your care team if you should be screened for STIs.  After age 24: Get screened for STIs if you're at risk. You are at risk for STIs (including HIV) if:  You are sexually active with more than one person.  You don't use condoms every time.  You or a partner was diagnosed with a sexually transmitted infection.  If you are at risk for HIV, ask about PrEP medicine to prevent HIV.  Get tested for HIV at least once in your life, whether you are at risk for HIV or not.  Cancer screening tests  Cervical cancer screening: If you have a cervix, begin getting regular cervical cancer screening tests at age 21. Most people who have regular screenings with normal results can stop after age 65. Talk about this with your provider.  Breast cancer scan (mammogram): If you've ever had breasts, begin having regular mammograms starting at age 40. This is a scan to check for breast cancer.  Colon cancer screening: It is important to start screening for colon cancer at age 45.  Have a colonoscopy test every 10 years (or more often if you're at risk) Or, ask your provider about stool tests like a FIT test every year or Cologuard test every 3 years.  To learn more about your testing options, visit: www.[employer]/480928.pdf.  For help making a decision, visit: david/bm39144.  Prostate cancer screening test: If you have a  prostate and are age 55 to 69, ask your provider if you would benefit from a yearly prostate cancer screening test.  Lung cancer screening: If you are a current or former smoker age 50 to 80, ask your care team if ongoing lung cancer screenings are right for you.  For informational purposes only. Not to replace the advice of your health care provider. Copyright   2023 [facility]. All rights reserved. Clinically reviewed by the [facility] Transitions Program. [employer] [id number] - REV 04/24.

## 2024-06-25 NOTE — PROGRESS NOTES
"Preventive Care Visit  [facility] DELILAH Saenz MD, Internal Medicine - Pediatrics  Jun 25, 2024      Assessment & Plan       ICD-10-CM    1. Routine general medical examination at a health care facility  Z00.00 PRIMARY CARE FOLLOW-UP SCHEDULING    Recent labs from rheumatology reviewed, repeating some urine tests today.     Immunizations and screenings UTD      2. CHILANGO-1 4G/5G genotype  Z15.89 Higher risk for clot, would not be candidate for estrogen containing products      3. Lupus nephritis (H)  M32.14 Greatly appreciate care from Dr Chavez - on stable medications and doing well      4. Inflammatory arthritis  M19.90 Doing well, staying very fit      5. Family history of clotting disorder  Z83.2 As above, no estrogen containing products in the future      6. Immunosuppressed status (H24)  D84.9 UTD on vaccines                  BMI  Estimated body mass index is 27.09 kg/m  as calculated from the following:    Height as of this encounter: 1.72 m (5' 7.72\").    Weight as of this encounter: 80.2 kg (176 lb 11.2 oz).   Weight management plan: Discussed healthy diet and exercise guidelines    Counseling  Appropriate preventive services were discussed with this patient, including applicable screening as appropriate for fall prevention, nutrition, physical activity, Tobacco-use cessation, weight loss and cognition.  Checklist reviewing preventive services available has been given to the patient.  Reviewed patient's diet, addressing concerns and/or questions.       Frankie Costa is a 19 year old, presenting for the following:  Physical        6/25/2024    10:50 AM   Additional Questions   Roomed by Peyton Quezada   Accompanied by IFRAH        Health Care Directive  Patient does not have a Health Care Directive or Living Will: Discussed advance care planning with patient; information given to patient to review.    HPI        6/25/2024   General Health   How would you rate your overall physical " health? Excellent   Feel stress (tense, anxious, or unable to sleep) Not at all            6/25/2024   Nutrition   Three or more servings of calcium each day? Yes   Diet: Regular (no restrictions)   How many servings of fruit and vegetables per day? (!) 0-1   How many sweetened beverages each day? 0-1            6/25/2024   Exercise   Days per week of moderate/strenous exercise 5 days   Average minutes spent exercising at this level 70 min            6/25/2024   Social Factors   Frequency of gathering with friends or relatives More than three times a week   Worry food won't last until get money to buy more No   Food not last or not have enough money for food? No   Do you have housing? (Housing is defined as stable permanent housing and does not include staying ouside in a car, in a tent, in an abandoned building, in an overnight shelter, or couch-surfing.) Yes   Are you worried about losing your housing? No   Lack of transportation? No   Unable to get utilities (heat,electricity)? No            6/25/2024   Dental   Dentist two times every year? Yes               Today's PHQ-2 Score:       6/25/2024    10:46 AM   PHQ-2 ( 1999 [employer])   Q1: Little interest or pleasure in doing things 0   Q2: Feeling down, depressed or hopeless 0   PHQ-2 Score 0   Q1: Little interest or pleasure in doing things Not at all   Q2: Feeling down, depressed or hopeless Not at all   PHQ-2 Score 0           6/25/2024   Substance Use   Alcohol more than 3/day or more than 7/wk No   Do you use any other substances recreationally? (!) ALCOHOL        Social History     Tobacco Use    Smoking status: Never    Smokeless tobacco: Never   Vaping Use    Vaping status: Never Used   Substance Use Topics    Alcohol use: Never    Drug use: Never           6/25/2024   STI Screening   New sexual partner(s) since last STI/HIV test? No        History of abnormal Pap smear: No - under age 21, PAP not appropriate for age             6/25/2024  "  Contraception/Family Planning   Questions about contraception or family planning No              Reviewed and updated as needed this visit by Provider                        Review of Systems   ROS: 10 point ROS neg other than the symptoms noted above in the HPI.       Objective    Exam  /70 (BP Location: Right arm, Patient Position: Sitting, Cuff Size: Adult Regular)   Pulse 63   Temp 97.8  F (36.6  C) (Tympanic)   Resp 18   Ht 1.72 m (5' 7.72\")   Wt 80.2 kg (176 lb 11.2 oz)   LMP 06/23/2024   SpO2 100%   BMI 27.09 kg/m     Estimated body mass index is 27.09 kg/m  as calculated from the following:    Height as of this encounter: 1.72 m (5' 7.72\").    Weight as of this encounter: 80.2 kg (176 lb 11.2 oz).    Physical Exam  GENERAL: alert and no distress  EYES: Eyes grossly normal to inspection, PERRL and conjunctivae and sclerae normal  HENT: ear canals and TM's normal, nose and mouth without ulcers or lesions  NECK: no adenopathy, no asymmetry, masses, or scars  RESP: lungs clear to auscultation - no rales, rhonchi or wheezes  CV: regular rate and rhythm, normal S1 S2, no S3 or S4, no murmur, click or rub, no peripheral edema  ABDOMEN: soft, nontender, no hepatosplenomegaly, no masses and bowel sounds normal  MS: no gross musculoskeletal defects noted, no edema  SKIN: no suspicious lesions or rashes  NEURO: Normal strength and tone, mentation intact and speech normal  PSYCH: mentation appears normal, affect normal/bright  : Exam declined by parent/patient.  Reason for decline: Patient/Parental preference      Vision Screen       Hearing Screen           Signed Electronically by: Eleonora Saenz MD    "

## 2024-06-26 ENCOUNTER — TELEPHONE (OUTPATIENT)
Dept: NEPHROLOGY | Facility: CLINIC | Age: 19
End: 2024-06-26
Payer: COMMERCIAL

## 2024-06-26 LAB
ALBUMIN MFR UR ELPH: 11.2 MG/DL
CREAT UR-MCNC: 267 MG/DL
PROT/CREAT 24H UR: 0.04 MG/MG CR (ref 0–0.2)

## 2024-06-26 NOTE — TELEPHONE ENCOUNTER
[facility] Call Center    Phone Message    May a detailed message be left on voicemail: yes     Reason for Call: Other: patient's mother called stating patient will only be available during the dates 12/16/24-01/03/25 for a follow up as that is when she will be back from college. Patient's mother wants to know if there is any way patient can see Dr. Duffy during that timeframe and would like a call back to discuss options moving forward, Please.      Action Taken: Other: PEDS NEPH    Travel Screening: Not Applicable     Date of Service: 06/26/24

## 2024-07-08 NOTE — TELEPHONE ENCOUNTER
Called and spoke with mom and given dates of 12/11/24 or 1/8/25 at [facility], but Juilssa will still either be at school for finals or returning back to school by that time.  Mom is wondering if follow-up in December can be with Dr. Chavez and in June when Julissa returns, she can have her final follow up with Dr. Norbert Duffy in Peds Nephrology before transferring to adult.  Msg sent to [facility]/  for update.    Dione Link  Pediatric Nephrology/ Neph Genetic Clinic  Sr. Patient Coordinator/ Sr. Complex Referral Specialist  [facility]/ [facility]

## 2024-07-10 NOTE — TELEPHONE ENCOUNTER
LM w/ writer's direct call back information to schedule follow-up appt w/ Dr. Norbert Duffy in June 2025 & schedule follow-up w/ Dr. Laquita Chavez in Dec 2024 to accommodate patient's school schedule.    Dione Link  Pediatric Nephrology/ Neph Genetic Clinic  Sr. Patient Coordinator/ Sr. Complex Referral Specialist  [facility]/ [facility]

## 2024-07-11 NOTE — TELEPHONE ENCOUNTER
Pt scheduled w/ Dr. Laquita Chavez on Monday, 12/30/24 @ 3.40pm- [facility] & scheduled w/ Dr. Norbert Duffy on Wednesday, 6/18/25 @ 3pm for final follow ups before transitioning to adult.  Confirmed appt time/ dates/ locations/ providers w/ Radha, pt's mother.    Dione Link  Pediatric Nephrology/ Neph Genetic Clinic  Sr. Patient Coordinator/ Sr. Complex Referral Specialist  [facility]/ [facility]

## 2024-08-28 ENCOUNTER — LAB (OUTPATIENT)
Dept: LAB | Facility: CLINIC | Age: 19
End: 2024-08-28
Payer: COMMERCIAL

## 2024-08-28 DIAGNOSIS — M32.14 LUPUS NEPHRITIS (H): ICD-10-CM

## 2024-08-28 LAB
ALBUMIN MFR UR ELPH: 6.3 MG/DL
ALBUMIN SERPL BCG-MCNC: 4.2 G/DL (ref 3.5–5.2)
ALBUMIN UR-MCNC: NEGATIVE MG/DL
ALP SERPL-CCNC: 63 U/L (ref 40–150)
ALT SERPL W P-5'-P-CCNC: 12 U/L (ref 0–50)
ANION GAP SERPL CALCULATED.3IONS-SCNC: 11 MMOL/L (ref 7–15)
APPEARANCE UR: CLEAR
AST SERPL W P-5'-P-CCNC: 18 U/L (ref 0–45)
BACTERIA #/AREA URNS HPF: ABNORMAL /HPF
BASOPHILS # BLD AUTO: 0 10E3/UL (ref 0–0.2)
BASOPHILS NFR BLD AUTO: 1 %
BILIRUB SERPL-MCNC: <0.2 MG/DL
BILIRUB UR QL STRIP: NEGATIVE
BUN SERPL-MCNC: 9.1 MG/DL (ref 6–20)
CALCIUM SERPL-MCNC: 9.1 MG/DL (ref 8.8–10.4)
CHLORIDE SERPL-SCNC: 107 MMOL/L (ref 98–107)
COLOR UR AUTO: YELLOW
CREAT SERPL-MCNC: 0.94 MG/DL (ref 0.51–0.95)
CREAT UR-MCNC: 127 MG/DL
EGFRCR SERPLBLD CKD-EPI 2021: 89 ML/MIN/1.73M2
EOSINOPHIL # BLD AUTO: 0.1 10E3/UL (ref 0–0.7)
EOSINOPHIL NFR BLD AUTO: 3 %
ERYTHROCYTE [DISTWIDTH] IN BLOOD BY AUTOMATED COUNT: 12.9 % (ref 10–15)
GLUCOSE SERPL-MCNC: 91 MG/DL (ref 70–99)
GLUCOSE UR STRIP-MCNC: NEGATIVE MG/DL
HCO3 SERPL-SCNC: 22 MMOL/L (ref 22–29)
HCT VFR BLD AUTO: 40.8 % (ref 35–47)
HGB BLD-MCNC: 12.7 G/DL (ref 11.7–15.7)
HGB UR QL STRIP: NEGATIVE
IMM GRANULOCYTES # BLD: 0 10E3/UL
IMM GRANULOCYTES NFR BLD: 0 %
KETONES UR STRIP-MCNC: ABNORMAL MG/DL
LEUKOCYTE ESTERASE UR QL STRIP: NEGATIVE
LYMPHOCYTES # BLD AUTO: 1.8 10E3/UL (ref 0.8–5.3)
LYMPHOCYTES NFR BLD AUTO: 44 %
MCH RBC QN AUTO: 28.6 PG (ref 26.5–33)
MCHC RBC AUTO-ENTMCNC: 31.1 G/DL (ref 31.5–36.5)
MCV RBC AUTO: 92 FL (ref 78–100)
MONOCYTES # BLD AUTO: 0.7 10E3/UL (ref 0–1.3)
MONOCYTES NFR BLD AUTO: 16 %
MUCOUS THREADS #/AREA URNS LPF: PRESENT /LPF
NEUTROPHILS # BLD AUTO: 1.4 10E3/UL (ref 1.6–8.3)
NEUTROPHILS NFR BLD AUTO: 36 %
NITRATE UR QL: NEGATIVE
PH UR STRIP: 5.5 [PH] (ref 5–7)
PLATELET # BLD AUTO: 177 10E3/UL (ref 150–450)
POTASSIUM SERPL-SCNC: 4.4 MMOL/L (ref 3.4–5.3)
PROT SERPL-MCNC: 6.7 G/DL (ref 6.4–8.3)
PROT/CREAT 24H UR: 0.05 MG/MG CR (ref 0–0.2)
RBC # BLD AUTO: 4.44 10E6/UL (ref 3.8–5.2)
RBC #/AREA URNS AUTO: ABNORMAL /HPF
SODIUM SERPL-SCNC: 140 MMOL/L (ref 135–145)
SP GR UR STRIP: 1.02 (ref 1–1.03)
SQUAMOUS #/AREA URNS AUTO: ABNORMAL /LPF
UROBILINOGEN UR STRIP-ACNC: 0.2 E.U./DL
WBC # BLD AUTO: 4 10E3/UL (ref 4–11)
WBC #/AREA URNS AUTO: ABNORMAL /HPF

## 2024-08-28 PROCEDURE — 86225 DNA ANTIBODY NATIVE: CPT

## 2024-08-28 PROCEDURE — 80053 COMPREHEN METABOLIC PANEL: CPT

## 2024-08-28 PROCEDURE — 81001 URINALYSIS AUTO W/SCOPE: CPT

## 2024-08-28 PROCEDURE — 86160 COMPLEMENT ANTIGEN: CPT

## 2024-08-28 PROCEDURE — 36415 COLL VENOUS BLD VENIPUNCTURE: CPT

## 2024-08-28 PROCEDURE — 84156 ASSAY OF PROTEIN URINE: CPT

## 2024-08-28 PROCEDURE — 85025 COMPLETE CBC W/AUTO DIFF WBC: CPT

## 2024-08-29 LAB
C3 SERPL-MCNC: 97 MG/DL (ref 81–157)
C4 SERPL-MCNC: 17 MG/DL (ref 13–39)
DSDNA AB SER-ACNC: 18 IU/ML

## 2024-11-26 ENCOUNTER — TRANSFERRED RECORDS (OUTPATIENT)
Dept: HEALTH INFORMATION MANAGEMENT | Facility: CLINIC | Age: 19
End: 2024-11-26
Payer: COMMERCIAL

## 2024-12-11 DIAGNOSIS — M32.14 OTHER SYSTEMIC LUPUS ERYTHEMATOSUS WITH GLOMERULAR DISEASE (H): Primary | Chronic | ICD-10-CM

## 2024-12-23 ENCOUNTER — LAB (OUTPATIENT)
Dept: LAB | Facility: CLINIC | Age: 19
End: 2024-12-23
Payer: COMMERCIAL

## 2024-12-23 DIAGNOSIS — M32.14 OTHER SYSTEMIC LUPUS ERYTHEMATOSUS WITH GLOMERULAR DISEASE (H): ICD-10-CM

## 2024-12-23 LAB
ALBUMIN MFR UR ELPH: 8.4 MG/DL
ALBUMIN SERPL BCG-MCNC: 3.9 G/DL (ref 3.5–5.2)
ALBUMIN UR-MCNC: ABNORMAL MG/DL
ALP SERPL-CCNC: 61 U/L (ref 40–150)
ALT SERPL W P-5'-P-CCNC: 15 U/L (ref 0–50)
ANION GAP SERPL CALCULATED.3IONS-SCNC: 8 MMOL/L (ref 7–15)
APPEARANCE UR: CLEAR
AST SERPL W P-5'-P-CCNC: 20 U/L (ref 0–45)
BACTERIA #/AREA URNS HPF: ABNORMAL /HPF
BASOPHILS # BLD AUTO: 0 10E3/UL (ref 0–0.2)
BASOPHILS NFR BLD AUTO: 0 %
BILIRUB SERPL-MCNC: 0.2 MG/DL
BILIRUB UR QL STRIP: NEGATIVE
BUN SERPL-MCNC: 12.2 MG/DL (ref 6–20)
CALCIUM SERPL-MCNC: 9.2 MG/DL (ref 8.8–10.4)
CHLORIDE SERPL-SCNC: 106 MMOL/L (ref 98–107)
COLOR UR AUTO: YELLOW
CREAT SERPL-MCNC: 0.86 MG/DL (ref 0.51–0.95)
CREAT UR-MCNC: 178 MG/DL
EGFRCR SERPLBLD CKD-EPI 2021: >90 ML/MIN/1.73M2
EOSINOPHIL # BLD AUTO: 0.1 10E3/UL (ref 0–0.7)
EOSINOPHIL NFR BLD AUTO: 2 %
ERYTHROCYTE [DISTWIDTH] IN BLOOD BY AUTOMATED COUNT: 12.8 % (ref 10–15)
GLUCOSE SERPL-MCNC: 96 MG/DL (ref 70–99)
GLUCOSE UR STRIP-MCNC: NEGATIVE MG/DL
HCO3 SERPL-SCNC: 25 MMOL/L (ref 22–29)
HCT VFR BLD AUTO: 39.6 % (ref 35–47)
HGB BLD-MCNC: 12.2 G/DL (ref 11.7–15.7)
HGB UR QL STRIP: NEGATIVE
IMM GRANULOCYTES # BLD: 0 10E3/UL
IMM GRANULOCYTES NFR BLD: 1 %
KETONES UR STRIP-MCNC: NEGATIVE MG/DL
LEUKOCYTE ESTERASE UR QL STRIP: NEGATIVE
LYMPHOCYTES # BLD AUTO: 3 10E3/UL (ref 0.8–5.3)
LYMPHOCYTES NFR BLD AUTO: 46 %
MCH RBC QN AUTO: 28.4 PG (ref 26.5–33)
MCHC RBC AUTO-ENTMCNC: 30.8 G/DL (ref 31.5–36.5)
MCV RBC AUTO: 92 FL (ref 78–100)
MONOCYTES # BLD AUTO: 0.5 10E3/UL (ref 0–1.3)
MONOCYTES NFR BLD AUTO: 8 %
NEUTROPHILS # BLD AUTO: 2.9 10E3/UL (ref 1.6–8.3)
NEUTROPHILS NFR BLD AUTO: 43 %
NITRATE UR QL: NEGATIVE
PH UR STRIP: 6.5 [PH] (ref 5–7)
PLATELET # BLD AUTO: 206 10E3/UL (ref 150–450)
POTASSIUM SERPL-SCNC: 4.1 MMOL/L (ref 3.4–5.3)
PROT SERPL-MCNC: 6.4 G/DL (ref 6.4–8.3)
PROT/CREAT 24H UR: 0.05 MG/MG CR (ref 0–0.2)
RBC # BLD AUTO: 4.3 10E6/UL (ref 3.8–5.2)
RBC #/AREA URNS AUTO: ABNORMAL /HPF
SODIUM SERPL-SCNC: 139 MMOL/L (ref 135–145)
SP GR UR STRIP: >=1.03 (ref 1–1.03)
SQUAMOUS #/AREA URNS AUTO: ABNORMAL /LPF
UROBILINOGEN UR STRIP-ACNC: 0.2 E.U./DL
WBC # BLD AUTO: 6.6 10E3/UL (ref 4–11)
WBC #/AREA URNS AUTO: ABNORMAL /HPF

## 2024-12-23 PROCEDURE — 86225 DNA ANTIBODY NATIVE: CPT

## 2024-12-23 PROCEDURE — 85025 COMPLETE CBC W/AUTO DIFF WBC: CPT

## 2024-12-23 PROCEDURE — 36415 COLL VENOUS BLD VENIPUNCTURE: CPT

## 2024-12-23 PROCEDURE — 80053 COMPREHEN METABOLIC PANEL: CPT

## 2024-12-23 PROCEDURE — 81001 URINALYSIS AUTO W/SCOPE: CPT

## 2024-12-23 PROCEDURE — 86160 COMPLEMENT ANTIGEN: CPT

## 2024-12-23 PROCEDURE — 84156 ASSAY OF PROTEIN URINE: CPT

## 2024-12-24 LAB
C3 SERPL-MCNC: 94 MG/DL (ref 81–157)
C4 SERPL-MCNC: 14 MG/DL (ref 13–39)
DSDNA AB SER-ACNC: 16 IU/ML

## 2024-12-29 PROBLEM — M19.90 INFLAMMATORY ARTHRITIS: Status: RESOLVED | Noted: 2019-03-27 | Resolved: 2024-12-29

## 2024-12-29 PROBLEM — Z15.89 PAI-1 4G/5G GENOTYPE: Chronic | Status: ACTIVE | Noted: 2020-02-10

## 2024-12-29 PROBLEM — M32.14 LUPUS NEPHRITIS (H): Chronic | Status: ACTIVE | Noted: 2020-01-07

## 2024-12-29 PROBLEM — Z83.2 FAMILY HISTORY OF CLOTTING DISORDER: Chronic | Status: ACTIVE | Noted: 2020-02-04

## 2024-12-29 NOTE — PATIENT INSTRUCTIONS
"Continue current treatment.   Basic labs in April o r may if not in [place] , full labs and appointment in May or June if in [place]    Precautions:   Immune Suppression: Routine care for infections and fevers. For fever illness with rash or an illness requiring emergency department or hospital visit, please call our office for advice. No live vaccinations, such as measles mumps rubella (MMR), varicella chickenpox, and intranasal influenza. Inactivated seasonal influenza and COVID vaccination is recommended as this patient is in the high-risk group for influenza.  Methotrexate: Infections: Hold for \"Mono\" (Poncho-Barr Virus, EBV), chicken pox, or \"shingles\" (herpes zoster). Medication interactions: Avoid antibiotics which contain trimethoprim (sulfamethoxazole/trimethoprim; trade names: Bactrim or Septra). can be used with naproxen and  other NSAIDS    Important updates to our practice:   Arrival time is 15 minutes before appointment time -- Dr. Chavez will start your visit at your appointment time. Please be early  Medication Refill: We will not be able to provide refills between appointments. A prescription with enough refills until one month after your next scheduled visit will be provided today. Your are responsible for any recommended lab monitoring tests before your next visit.  There is no staff to call you for appointments so it is your responsibility to schedule and arrive at your appointment.     For Patient Education Materials:  [email or web address]/[email or web address]       [facility] [place] Nurse Coordinators: [phone] or by [facility] to help with questions about your child's rheumatic condition    After Hours/Paging : [phone]  For urgent issues after hours or on the weekends, please call the page  ask to speak to the physician on-call for Pediatric Rheumatology. Please do not use [employer] for urgent requests.    Infusions in [place] at [facility]: " [phone].       [phone],  Main  Services:  Turks and Caicos Islander: [phone], Uzbek: [phone], Hmong/Tajik/Yakut: [phone]

## 2024-12-29 NOTE — PROGRESS NOTES
[facility] [place] [address]  [facility] [address]  Phone: [phone]  Fax: [phone]    Patient: Julissa Contreras,  preferred name is Julissa, Date of birth 2005  Date of Visit:  12/29/2024, accompanied by mother   Referring Provider: Eleonora Saenz  Primary Care Provider: Dr. Eleonora Saenz    Patient Active Problem List   Diagnosis    Other forms of systemic lupus erythematosus (H)    Screening for eye condition    Immunosuppressed status (H)    Lupus nephritis (H)    Family history of clotting disorder    CHILANGO-1 4G/5G genotype          Rheumatology History:   See previous note for longer-term history.  Infectious screening and immunizations:   Hepatitis B Core Kusum   Date Value Ref Range Status   04/01/2019 Nonreactive NR^Nonreactive Final     Hepatitis C Antibody   Date Value Ref Range Status   04/01/2019 Nonreactive NR^Nonreactive Final     Comment:     Assay performance characteristics have not been established for newborns,   infants, and children       Quantiferon-TB Gold Plus Result   Date Value Ref Range Status   04/01/2019 Negative NEG^Negative Final     Comment:     No interferon gamma response to M.tuberculosis antigens was detected.   Infection with M.tuberculosis is unlikely, however a single negative result   does not exclude infection. In patients at high risk for infection, a second   test should be considered  in accordance with the 2017 ATS/IDSA/CDC Clinical Practice Guidelines for   Diagnosis of Tuberculosis in Adults and Children [Lewinsohn GISELA et   al.Clin.Infect.Dis. 2017 64(2):111-115].            Subjective:   Julissa is a 19 year old female who was seen in Pediatric Rheumatology clinic today.  Julissa was last seen in our clinic on 6/17/2024: Doing well at this time.  No changes in treatment.    12/29/2024: Julissa has no concerns today.  She is taking medications as prescribed and has no difficulty with them.  She  would like to continue medications and being seen here in rheumatology until she finishes college she will be transitioning likely out of her current state to them unspecified location based on her job.  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          Allergies:     No Known Allergies       Medications:     Current Outpatient Medications   Medication Sig Dispense Refill    hydroxychloroquine (PLAQUENIL) 200 MG tablet Take 2 tablets (400 mg) by mouth daily. 180 tablet 4    mycophenolate (GENERIC EQUIVALENT) 500 MG tablet Take 2 tablets (1,000 mg) by mouth 2 times daily. 360 tablet 4    cholecalciferol 50 MCG (2000 UT) tablet Take 1 tablet (50 mcg) by mouth daily 60 tablet 0     No current facility-administered medications for this visit.      No current facility-administered medications for this visit.        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  Elevated CHILANGO-1 levels are associated with an increased risk of CAD and VTE     Past Surgical History:   Procedure Laterality Date    HC BIOPSY RENAL, PERCUTANEOUS  3/3/2020         HC TOOTH EXTRACTION W/FORCEP Bilateral 08/2018    PERCUTANEOUS BIOPSY KIDNEY N/A 3/3/2020    Procedure: Percutaneous Native Kidney Biopsy;  Surgeon: Julissa Cornejo MD;  Location: [facility]      Family History   Problem Relation Age of Onset    Nephrolithiasis Mother     Osteoporosis Maternal Grandmother     Hyperlipidemia Maternal Grandmother     Heart Disease Maternal Grandfather     Pancreatic Cancer Paternal Grandmother     Lymphoma Other     Factor V Leiden  "deficiency Maternal Aunt     Pancreatic Cancer Paternal Great-Grandmother     Lung Cancer Paternal Grandfather     Rheumatic fever Paternal Grandfather     Fibroids Paternal Aunt     Cancer Maternal Great-Grandfather     Kidney Disease No family hx of      Social History     Social History Narrative    She lives at home with mom and dad. Older sister is in college in [place]. No pets.     Plays soccer, basketball, and volleyball.     7/2021She is thinking going to college in the [employer] and is likely going to study computer science or game theory          Examination:   Blood pressure 124/81, pulse 64, height 1.707 m (5' 7.21\"), weight 83.6 kg (184 lb 4.9 oz), not currently breastfeeding.  Facility age limit for growth %jesus is 20 years.  Growth %ile SmartLinks can only be used for patients less than 20 years old.  Body surface area is 1.99 meters squared.     Constitutional: alert, no distress and cooperative  Head and Eyes: No alopecia, PEERL, conjunctiva clear  ENT: mucous membranes moist, healthy appearing dentition, no intraoral ulcers and no intranasal ulcers  Neck: Neck supple. No lymphadenopathy. Thyroid symmetric, normal size,  Gastrointestinal: Abdomen soft, non-tender., No masses, No hepatosplenomegaly  : Deferred  Neurologic: Gait normal.  Sensation grossly normal.  Psychiatric: mentation appears normal and affect normal  Hematologic/Lymphatic/Immunologic: Normal cervical, axillary lymph nodes  Skin: no rashes  Musculoskeletal: gait normal, extremities warm, well perfused. Detailed musculoskeletal exam was performed, normal muscle strength of trunk, upper and lower extremities and no sign of swelling, tenderness at joints or entheses, or decreased ROM unless otherwise noted below.            Last Imaging Results:     Results for orders placed or performed during the hospital encounter of 03/03/20   [place]ided [place] Pl[place]    Narrative    This exam was marked as non-reportable " because it will not be read by a   radiologist or a [place] non-radiologist provider.                    Last Lab Results:     No visits with results within 2 Day(s) from this visit.   Latest known visit with results is:   Lab on 12/23/2024   Component Date Value    Sodium 12/23/2024 139     Potassium 12/23/2024 4.1     Carbon Dioxide (CO2) 12/23/2024 25     Anion Gap 12/23/2024 8     Urea Nitrogen 12/23/2024 12.2     Creatinine 12/23/2024 0.86     GFR Estimate 12/23/2024 >90     Calcium 12/23/2024 9.2     Chloride 12/23/2024 106     Glucose 12/23/2024 96     Alkaline Phosphatase 12/23/2024 61     AST 12/23/2024 20     ALT 12/23/2024 15     Protein Total 12/23/2024 6.4     Albumin 12/23/2024 3.9     Bilirubin Total 12/23/2024 0.2     DNA (ds) Antibody 12/23/2024 16.0 (H)     C3 Complement 12/23/2024 94     C4 Complement 12/23/2024 14     Color Urine 12/23/2024 Yellow     Appearance Urine 12/23/2024 Clear     Glucose Urine 12/23/2024 Negative     Bilirubin Urine 12/23/2024 Negative     Ketones Urine 12/23/2024 Negative     Specific Gravity Urine 12/23/2024 >=1.030     Blood Urine 12/23/2024 Negative     pH Urine 12/23/2024 6.5     Protein Albumin Urine 12/23/2024 Trace (A)     Urobilinogen Urine 12/23/2024 0.2     Nitrite Urine 12/23/2024 Negative     Leukocyte Esterase Urine 12/23/2024 Negative     Total Protein Urine mg/dL 12/23/2024 8.4     Total Protein Urine mg/m* 12/23/2024 0.05     Creatinine Urine mg/dL 12/23/2024 178.0     WBC Count 12/23/2024 6.6     RBC Count 12/23/2024 4.30     Hemoglobin 12/23/2024 12.2     Hematocrit 12/23/2024 39.6     MCV 12/23/2024 92     MCH 12/23/2024 28.4     MCHC 12/23/2024 30.8 (L)     RDW 12/23/2024 12.8     Platelet Count 12/23/2024 206     % Neutrophils 12/23/2024 43     % Lymphocytes 12/23/2024 46     % Monocytes 12/23/2024 8     % Eosinophils 12/23/2024 2     % Basophils 12/23/2024 0     % Immature Granulocytes 12/23/2024 1     Absolute Neutrophils 12/23/2024 2.9      Absolute Lymphocytes 12/23/2024 3.0     Absolute Monocytes 12/23/2024 0.5     Absolute Eosinophils 12/23/2024 0.1     Absolute Basophils 12/23/2024 0.0     Absolute Immature Granul* 12/23/2024 0.0     Bacteria Urine 12/23/2024 Moderate (A)     RBC Urine 12/23/2024 0-2     WBC Urine 12/23/2024 0-5     Squamous Epithelials Uri* 12/23/2024 Few (A)           Assessment :        Other systemic lupus erythematosus with other organ involvement (H)  Lupus nephritis (H)  Screening for eye condition  Immunosuppressed status (H)  Current chronic use of systemic steroids    Julissa has no sign of active lupus based on her clinical appearance and laboratory testing.  I recommend no changes in her treatment plan.  Given how well she is doing I think it is fine to delay her laboratory testing and follow-up for 8 months rather than typical 6 months.  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  I placed a separate order for medication monitoring/safety labs and provided that to her on paper in hopes that she can get that done in [place].         Recommendations and follow-up:     Continue current treatment until at least summer 2026 at which time she will transition to adult rheumatology and after that they can make some decisions regarding weaning mycophenolate.    Laboratory, Radiology, Referrals:          Orders Placed This Encounter   Procedures    Hepatic panel    Creatinine    CBC with platelets differential     Ophthalmology examination: MREYEFREQ: for hydroxchloroquine use, comprehensive eye exam with visual field and OCT, baseline then every 5 years.     Precautions:   Immune Suppression: Routine care for infections and fevers. For fever illness with rash or an illness requiring emergency department or hospital  visit, please call our office for advice. No live vaccinations, such as measles mumps rubella (MMR), varicella chickenpox, and intranasal influenza. Inactivated seasonal influenza and COVID vaccination is recommended as this patient is in the high-risk group for influenza.  Pregnancy: this patient is on medications that can cause pregnancy loss or birth defects. Patient was cautioned to avoid pregnancy, to hold all medications if she thinks she is pregnant and to notify our office immediately.  Sun Exposure: This patient's medication(s) and/or condition make them sun sensitive, causing skin rash or flare of symptoms. Sun avoidance and physical and chemical sunblocks are recommended.     Return visit: Follow-up in September 2025 with full lupus laboratory testing    If there are any new questions or concerns, I would be glad to help and can be reached through our main office at [phone] or our paging  at [phone].    Laquita Chavez MD, MS   of Pediatrics  Pediatric Rheumatology  [facility]    Review of the result(s) of each unique test - previous testing  Ordering of each unique test  Prescription drug management  I spent a total of 45 minutes on the day of the visit.   Time spent by me today doing chart review, history and exam, documentation and further activities per the note      The longitudinal plan of care for the diagnosis(es)/condition(s) as documented were addressed during this visit. Due to the added complexity in care, I will continue to support Julissa in the subsequent management and with ongoing continuity of care.

## 2024-12-30 ENCOUNTER — OFFICE VISIT (OUTPATIENT)
Dept: RHEUMATOLOGY | Facility: CLINIC | Age: 19
End: 2024-12-30
Attending: PEDIATRICS
Payer: COMMERCIAL

## 2024-12-30 VITALS
HEIGHT: 67 IN | WEIGHT: 184.3 LBS | SYSTOLIC BLOOD PRESSURE: 124 MMHG | DIASTOLIC BLOOD PRESSURE: 81 MMHG | BODY MASS INDEX: 28.93 KG/M2 | HEART RATE: 64 BPM

## 2024-12-30 DIAGNOSIS — Z13.5 SCREENING FOR EYE CONDITION: ICD-10-CM

## 2024-12-30 DIAGNOSIS — M32.14 LUPUS NEPHRITIS (H): ICD-10-CM

## 2024-12-30 DIAGNOSIS — D84.9 IMMUNOSUPPRESSED STATUS (H): ICD-10-CM

## 2024-12-30 DIAGNOSIS — M32.19 OTHER SYSTEMIC LUPUS ERYTHEMATOSUS WITH OTHER ORGAN INVOLVEMENT (H): Primary | Chronic | ICD-10-CM

## 2024-12-30 DIAGNOSIS — Z79.52 CURRENT CHRONIC USE OF SYSTEMIC STEROIDS: ICD-10-CM

## 2024-12-30 PROCEDURE — 99213 OFFICE O/P EST LOW 20 MIN: CPT | Performed by: PEDIATRICS

## 2024-12-30 RX ORDER — HYDROXYCHLOROQUINE SULFATE 200 MG/1
400 TABLET, FILM COATED ORAL DAILY
Qty: 180 TABLET | Refills: 4 | Status: SHIPPED | OUTPATIENT
Start: 2024-12-30

## 2024-12-30 RX ORDER — MYCOPHENOLATE MOFETIL 500 MG/1
1000 TABLET ORAL 2 TIMES DAILY
Qty: 360 TABLET | Refills: 4 | Status: CANCELLED | OUTPATIENT
Start: 2024-12-30

## 2024-12-30 RX ORDER — MYCOPHENOLATE MOFETIL 500 MG/1
1000 TABLET ORAL 2 TIMES DAILY
Qty: 360 TABLET | Refills: 4 | Status: SHIPPED | OUTPATIENT
Start: 2024-12-30

## 2024-12-30 NOTE — LETTER
12/30/2024      RE: Julissa Contreras  [address] [facility] [address]     Dear Colleague,    Thank you for the opportunity to participate in the care of your patient, Julissa Contreras, at the [facility] [place] at [facility]. Please see a copy of my visit note below.        [facility] [place]  [address]  [facility] [address]  Phone: [phone]  Fax: [phone]    Patient: Julissa Contreras,  preferred name is Julissa, Date of birth 2005  Date of Visit:  12/29/2024, accompanied by mother   Referring Provider: Eleonora Saenz  Primary Care Provider: Dr. Eleonora Saenz    Patient Active Problem List   Diagnosis     Other forms of systemic lupus erythematosus (H)     Screening for eye condition     Immunosuppressed status (H)     Lupus nephritis (H)     Family history of clotting disorder     CHILANGO-1 4G/5G genotype          Rheumatology History:   See previous note for longer-term history.  Infectious screening and immunizations:   Hepatitis B Core [place]um   Date Value Ref Range Status   04/01/2019 Nonreactive NR^Nonreactive Final     Hepatitis C Antibody   Date Value Ref Range Status   04/01/2019 Nonreactive NR^Nonreactive Final     Comment:     Assay performance characteristics have not been established for newborns,   infants, and children       Quantiferon-TB Gold Plus Result   Date Value Ref Range Status   04/01/2019 Negative NEG^Negative Final     Comment:     No interferon gamma response to M.tuberculosis antigens was detected.   Infection with M.tuberculosis is unlikely, however a single negative result   does not exclude infection. In patients at high risk for infection, a second   test should be considered  in accordance with the 2017 ATS/IDSA/CDC Clinical Practice Guidelines for   Diagnosis of Tuberculosis in Adults and Children [Bree HIGGINS et    al.Clin.Infect.Dis. 2017 64(2):111-115].            Subjective:   Julissa is a 19 year old female who was seen in Pediatric Rheumatology clinic today.  Julissa was last seen in our clinic on 6/17/2024: Doing well at this time.  No changes in treatment.    12/29/2024: Julissa has no concerns today.  She is taking medications as prescribed and has no difficulty with them.  She would like to continue medications and being seen here in rheumatology until she finishes college she will be transitioning likely out of her current state to them unspecified location based on her job.  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          Allergies:     No Known Allergies       Medications:     Current Outpatient Medications   Medication Sig Dispense Refill     hydroxychloroquine (PLAQUENIL) 200 MG tablet Take 2 tablets (400 mg) by mouth daily. 180 tablet 4     mycophenolate (GENERIC EQUIVALENT) 500 MG tablet Take 2 tablets (1,000 mg) by mouth 2 times daily. 360 tablet 4     cholecalciferol 50 MCG (2000 UT) tablet Take 1 tablet (50 mcg) by mouth daily 60 tablet 0     No current facility-administered medications for this visit.      No current facility-administered medications for this visit.        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  Elevated CHILANGO-1 levels are associated with an increased risk of CAD and VTE     Past Surgical History:   Procedure Laterality Date     HC BIOPSY RENAL, PERCUTANEOUS  3/3/2020          HC TOOTH EXTRACTION W/FORCEP Bilateral 08/2018     PERCUTANEOUS BIOPSY KIDNEY N/A 3/3/2020  "   Procedure: Percutaneous Native Kidney Biopsy;  Surgeon: Julissa Cornejo MD;  Location: [facility]      Family History   Problem Relation Age of Onset     Nephrolithiasis Mother      Osteoporosis Maternal Grandmother      Hyperlipidemia Maternal Grandmother      Heart Disease Maternal Grandfather      Pancreatic Cancer Paternal Grandmother      Lymphoma Other      Factor V Leiden deficiency Maternal Aunt      Pancreatic Cancer Paternal Great-Grandmother      Lung Cancer Paternal Grandfather      Rheumatic fever Paternal Grandfather      Fibroids Paternal Aunt      Cancer Maternal Great-Grandfather      Kidney Disease No family hx of      Social History     Social History Narrative    She lives at home with mom and dad. Older sister is in college in [place]. No pets.     Plays soccer, basketball, and volleyball.     7/2021She is thinking going to college in the [employer] and is likely going to study computer science or game theory          Examination:   Blood pressure 124/81, pulse 64, height 1.707 m (5' 7.21\"), weight 83.6 kg (184 lb 4.9 oz), not currently breastfeeding.  Facility age limit for growth %jesus is 20 years.  Growth %ile SmartLinks can only be used for patients less than 20 years old.  Body surface area is 1.99 meters squared.     Constitutional: alert, no distress and cooperative  Head and Eyes: No alopecia, PEERL, conjunctiva clear  ENT: mucous membranes moist, healthy appearing dentition, no intraoral ulcers and no intranasal ulcers  Neck: Neck supple. No lymphadenopathy. Thyroid symmetric, normal size,  Gastrointestinal: Abdomen soft, non-tender., No masses, No hepatosplenomegaly  : Deferred  Neurologic: Gait normal.  Sensation grossly normal.  Psychiatric: mentation appears normal and affect normal  Hematologic/Lymphatic/Immunologic: Normal cervical, axillary lymph nodes  Skin: no rashes  Musculoskeletal: gait normal, extremities warm, well perfused. Detailed musculoskeletal exam was " performed, normal muscle strength of trunk, upper and lower extremities and no sign of swelling, tenderness at joints or entheses, or decreased ROM unless otherwise noted below.            Last Imaging Results:     Results for orders placed or performed during the hospital encounter of 03/03/20   US Guided Needle Placement ([facility] Only)    Narrative    This exam was marked as non-reportable because it will not be read by a   radiologist or a [place] non-radiologist provider.                    Last Lab Results:     No visits with results within 2 Day(s) from this visit.   Latest known visit with results is:   Lab on 12/23/2024   Component Date Value     Sodium 12/23/2024 139      Potassium 12/23/2024 4.1      Carbon Dioxide (CO2) 12/23/2024 25      Anion Gap 12/23/2024 8      Urea Nitrogen 12/23/2024 12.2      Creatinine 12/23/2024 0.86      GFR Estimate 12/23/2024 >90      Calcium 12/23/2024 9.2      Chloride 12/23/2024 106      Glucose 12/23/2024 96      Alkaline Phosphatase 12/23/2024 61      AST 12/23/2024 20      ALT 12/23/2024 15      Protein Total 12/23/2024 6.4      Albumin 12/23/2024 3.9      Bilirubin Total 12/23/2024 0.2      DNA (ds) Antibody 12/23/2024 16.0 (H)      C3 Complement 12/23/2024 94      C4 Complement 12/23/2024 14      Color Urine 12/23/2024 Yellow      Appearance Urine 12/23/2024 Clear      Glucose Urine 12/23/2024 Negative      Bilirubin Urine 12/23/2024 Negative      Ketones Urine 12/23/2024 Negative      Specific Gravity Urine 12/23/2024 >=1.030      Blood Urine 12/23/2024 Negative      pH Urine 12/23/2024 6.5      Protein Albumin Urine 12/23/2024 Trace (A)      Urobilinogen Urine 12/23/2024 0.2      Nitrite Urine 12/23/2024 Negative      Leukocyte Esterase Urine 12/23/2024 Negative      Total Protein Urine mg/dL 12/23/2024 8.4      Total Protein Urine mg/m* 12/23/2024 0.05      Creatinine Urine mg/dL 12/23/2024 178.0      WBC Count 12/23/2024 6.6      RBC Count 12/23/2024 4.30       Hemoglobin 12/23/2024 12.2      Hematocrit 12/23/2024 39.6      MCV 12/23/2024 92      MCH 12/23/2024 28.4      MCHC 12/23/2024 30.8 (L)      RDW 12/23/2024 12.8      Platelet Count 12/23/2024 206      % Neutrophils 12/23/2024 43      % Lymphocytes 12/23/2024 46      % Monocytes 12/23/2024 8      % Eosinophils 12/23/2024 2      % Basophils 12/23/2024 0      % Immature Granulocytes 12/23/2024 1      Absolute Neutrophils 12/23/2024 2.9      Absolute Lymphocytes 12/23/2024 3.0      Absolute Monocytes 12/23/2024 0.5      Absolute Eosinophils 12/23/2024 0.1      Absolute Basophils 12/23/2024 0.0      Absolute Immature Granul* 12/23/2024 0.0      Bacteria Urine 12/23/2024 Moderate (A)      RBC Urine 12/23/2024 0-2      WBC Urine 12/23/2024 0-5      Squamous Epithelials Uri* 12/23/2024 Few (A)           Assessment :        Other systemic lupus erythematosus with other organ involvement (H)  Lupus nephritis (H)  Screening for eye condition  Immunosuppressed status (H)  Current chronic use of systemic steroids    Julissa has no sign of active lupus based on her clinical appearance and laboratory testing.  I recommend no changes in her treatment plan.  Given how well she is doing I think it is fine to delay her laboratory testing and follow-up for 8 months rather than typical 6 months.  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  I placed a separate order for medication monitoring/safety labs and provided that to her on paper in hopes that she can get that done in [place].         Recommendations and follow-up:     Continue current treatment until at least summer 2026 at which time she will transition to adult rheumatology and after that they can make some decisions regarding weaning mycophenolate.    Laboratory,  Radiology, Referrals:          Orders Placed This Encounter   Procedures     Hepatic panel     Creatinine     CBC with platelets differential     Ophthalmology examination: MREYEFREQ: for hydroxchloroquine use, comprehensive eye exam with visual field and OCT, baseline then every 5 years.     Precautions:   Immune Suppression: Routine care for infections and fevers. For fever illness with rash or an illness requiring emergency department or hospital visit, please call our office for advice. No live vaccinations, such as measles mumps rubella (MMR), varicella chickenpox, and intranasal influenza. Inactivated seasonal influenza and COVID vaccination is recommended as this patient is in the high-risk group for influenza.  Pregnancy: this patient is on medications that can cause pregnancy loss or birth defects. Patient was cautioned to avoid pregnancy, to hold all medications if she thinks she is pregnant and to notify our office immediately.  Sun Exposure: This patient's medication(s) and/or condition make them sun sensitive, causing skin rash or flare of symptoms. Sun avoidance and physical and chemical sunblocks are recommended.     Return visit: Follow-up in September 2025 with full lupus laboratory testing    If there are any new questions or concerns, I would be glad to help and can be reached through our main office at [phone] or our paging  at [phone].    Laquita Chavez MD, MS   of Pediatrics  Pediatric Rheumatology  [facility]    Review of the result(s) of each unique test - previous testing  Ordering of each unique test  Prescription drug management  I spent a total of 45 minutes on the day of the visit.   Time spent by me today doing chart review, history and exam, documentation and further activities per the note      The longitudinal plan of care for the diagnosis(es)/condition(s) as documented were addressed during this visit.  Due to the added complexity in care, I will continue to support Julissa in the subsequent management and with ongoing continuity of care.      Please do not hesitate to contact me if you have any questions/concerns.     Sincerely,       Laquita Chavez MD

## 2024-12-30 NOTE — NURSING NOTE
"Informant-    Julissa is accompanied by mother    Reason for Visit-  Follow up    Vitals signs-  /81   Pulse 64   Ht 1.707 m (5' 7.21\")   Wt 83.6 kg (184 lb 4.9 oz)   BMI 28.69 kg/m      There are concerns about the child's exposure to violence in the home: No    Need Flu Shot: No    Need MyChart: No    Does the patient need any medication refills today? No    Face to Face time: 5 Minutes  Sammi SY MA      "

## 2025-07-07 ENCOUNTER — LAB (OUTPATIENT)
Dept: LAB | Facility: CLINIC | Age: 20
End: 2025-07-07
Payer: COMMERCIAL

## 2025-07-07 ENCOUNTER — OFFICE VISIT (OUTPATIENT)
Dept: RHEUMATOLOGY | Facility: CLINIC | Age: 20
End: 2025-07-07
Attending: PEDIATRICS
Payer: COMMERCIAL

## 2025-07-07 VITALS
SYSTOLIC BLOOD PRESSURE: 121 MMHG | WEIGHT: 173.5 LBS | HEART RATE: 73 BPM | DIASTOLIC BLOOD PRESSURE: 79 MMHG | HEIGHT: 67 IN | BODY MASS INDEX: 27.23 KG/M2

## 2025-07-07 DIAGNOSIS — M32.19 OTHER SYSTEMIC LUPUS ERYTHEMATOSUS WITH OTHER ORGAN INVOLVEMENT (H): ICD-10-CM

## 2025-07-07 DIAGNOSIS — D84.9 IMMUNOSUPPRESSED STATUS: ICD-10-CM

## 2025-07-07 DIAGNOSIS — M32.14 LUPUS NEPHRITIS (H): ICD-10-CM

## 2025-07-07 DIAGNOSIS — M32.19 OTHER SYSTEMIC LUPUS ERYTHEMATOSUS WITH OTHER ORGAN INVOLVEMENT (H): Chronic | ICD-10-CM

## 2025-07-07 DIAGNOSIS — Z79.52 CURRENT CHRONIC USE OF SYSTEMIC STEROIDS: ICD-10-CM

## 2025-07-07 DIAGNOSIS — M32.14 OTHER SYSTEMIC LUPUS ERYTHEMATOSUS WITH GLOMERULAR DISEASE (H): ICD-10-CM

## 2025-07-07 DIAGNOSIS — Z13.5 SCREENING FOR EYE CONDITION: ICD-10-CM

## 2025-07-07 DIAGNOSIS — E55.9 VITAMIN D INSUFFICIENCY: ICD-10-CM

## 2025-07-07 LAB
ALBUMIN MFR UR ELPH: 14.9 MG/DL
ALBUMIN SERPL BCG-MCNC: 4.4 G/DL (ref 3.5–5.2)
ALBUMIN UR-MCNC: NEGATIVE MG/DL
ALP SERPL-CCNC: 62 U/L (ref 40–150)
ALT SERPL W P-5'-P-CCNC: 14 U/L (ref 0–50)
ANION GAP SERPL CALCULATED.3IONS-SCNC: 11 MMOL/L (ref 7–15)
APPEARANCE UR: CLEAR
AST SERPL W P-5'-P-CCNC: 18 U/L (ref 0–45)
BACTERIA #/AREA URNS HPF: ABNORMAL /HPF
BASOPHILS # BLD AUTO: 0 10E3/UL (ref 0–0.2)
BASOPHILS NFR BLD AUTO: 0 %
BILIRUB SERPL-MCNC: 0.4 MG/DL
BILIRUB UR QL STRIP: NEGATIVE
BILIRUBIN DIRECT (ROCHE PRO & PURE): 0.18 MG/DL (ref 0–0.45)
BUN SERPL-MCNC: 12.2 MG/DL (ref 6–20)
CALCIUM SERPL-MCNC: 9.6 MG/DL (ref 8.8–10.4)
CHLORIDE SERPL-SCNC: 105 MMOL/L (ref 98–107)
COLOR UR AUTO: YELLOW
CREAT SERPL-MCNC: 0.98 MG/DL (ref 0.51–0.95)
CREAT UR-MCNC: 178 MG/DL
EGFRCR SERPLBLD CKD-EPI 2021: 84 ML/MIN/1.73M2
EOSINOPHIL # BLD AUTO: 0.1 10E3/UL (ref 0–0.7)
EOSINOPHIL NFR BLD AUTO: 1 %
ERYTHROCYTE [DISTWIDTH] IN BLOOD BY AUTOMATED COUNT: 12.8 % (ref 10–15)
GLUCOSE SERPL-MCNC: 97 MG/DL (ref 70–99)
GLUCOSE UR STRIP-MCNC: NEGATIVE MG/DL
HCO3 SERPL-SCNC: 23 MMOL/L (ref 22–29)
HCT VFR BLD AUTO: 40.7 % (ref 35–47)
HGB BLD-MCNC: 12.8 G/DL (ref 11.7–15.7)
HGB UR QL STRIP: NEGATIVE
IMM GRANULOCYTES # BLD: 0 10E3/UL
IMM GRANULOCYTES NFR BLD: 0 %
KETONES UR STRIP-MCNC: NEGATIVE MG/DL
LEUKOCYTE ESTERASE UR QL STRIP: NEGATIVE
LYMPHOCYTES # BLD AUTO: 2.6 10E3/UL (ref 0.8–5.3)
LYMPHOCYTES NFR BLD AUTO: 40 %
MCH RBC QN AUTO: 28.8 PG (ref 26.5–33)
MCHC RBC AUTO-ENTMCNC: 31.4 G/DL (ref 31.5–36.5)
MCV RBC AUTO: 92 FL (ref 78–100)
MONOCYTES # BLD AUTO: 0.5 10E3/UL (ref 0–1.3)
MONOCYTES NFR BLD AUTO: 7 %
MUCOUS THREADS #/AREA URNS LPF: PRESENT /LPF
NEUTROPHILS # BLD AUTO: 3.3 10E3/UL (ref 1.6–8.3)
NEUTROPHILS NFR BLD AUTO: 51 %
NITRATE UR QL: NEGATIVE
PH UR STRIP: 6 [PH] (ref 5–7)
PLATELET # BLD AUTO: 245 10E3/UL (ref 150–450)
POTASSIUM SERPL-SCNC: 4.4 MMOL/L (ref 3.4–5.3)
PROT SERPL-MCNC: 7 G/DL (ref 6.4–8.3)
PROT/CREAT 24H UR: 0.08 MG/MG CR (ref 0–0.2)
RBC # BLD AUTO: 4.45 10E6/UL (ref 3.8–5.2)
RBC #/AREA URNS AUTO: ABNORMAL /HPF
SODIUM SERPL-SCNC: 139 MMOL/L (ref 135–145)
SP GR UR STRIP: >=1.03 (ref 1–1.03)
SQUAMOUS #/AREA URNS AUTO: ABNORMAL /LPF
UROBILINOGEN UR STRIP-ACNC: 0.2 E.U./DL
WBC # BLD AUTO: 6.6 10E3/UL (ref 4–11)
WBC #/AREA URNS AUTO: ABNORMAL /HPF

## 2025-07-07 PROCEDURE — 85025 COMPLETE CBC W/AUTO DIFF WBC: CPT

## 2025-07-07 PROCEDURE — 81001 URINALYSIS AUTO W/SCOPE: CPT

## 2025-07-07 PROCEDURE — 82248 BILIRUBIN DIRECT: CPT

## 2025-07-07 PROCEDURE — 86225 DNA ANTIBODY NATIVE: CPT

## 2025-07-07 PROCEDURE — 36415 COLL VENOUS BLD VENIPUNCTURE: CPT

## 2025-07-07 PROCEDURE — 99213 OFFICE O/P EST LOW 20 MIN: CPT | Performed by: PEDIATRICS

## 2025-07-07 PROCEDURE — 86160 COMPLEMENT ANTIGEN: CPT

## 2025-07-07 PROCEDURE — 80053 COMPREHEN METABOLIC PANEL: CPT

## 2025-07-07 PROCEDURE — 84156 ASSAY OF PROTEIN URINE: CPT

## 2025-07-07 RX ORDER — HYDROXYCHLOROQUINE SULFATE 200 MG/1
400 TABLET, FILM COATED ORAL DAILY
Qty: 180 TABLET | Refills: 4 | Status: SHIPPED | OUTPATIENT
Start: 2025-07-07

## 2025-07-07 RX ORDER — CHOLECALCIFEROL (VITAMIN D3) 50 MCG
1 TABLET ORAL DAILY
Qty: 60 TABLET | Refills: 0 | Status: CANCELLED | OUTPATIENT
Start: 2025-07-07

## 2025-07-07 RX ORDER — MYCOPHENOLATE MOFETIL 500 MG/1
1000 TABLET ORAL 2 TIMES DAILY
Qty: 360 TABLET | Refills: 4 | Status: SHIPPED | OUTPATIENT
Start: 2025-07-07

## 2025-07-07 NOTE — PATIENT INSTRUCTIONS
Meet with [facility] pharmacist about immunizations  Keep same medications  Labs and visit in 5-6 months

## 2025-07-07 NOTE — NURSING NOTE
"Informant-    Julissa is accompanied by self    Reason for Visit-  Follow up    Vitals signs-  /79   Pulse 73   Ht 1.71 m (5' 7.32\")   Wt 78.7 kg (173 lb 8 oz)   BMI 26.91 kg/m      There are concerns about the child's exposure to violence in the home: No    Need Flu Shot: No    Need MyChart: No    Does the patient need any medication refills today? No    Face to Face time: 5 Minutes  Sammi SY MA      "

## 2025-07-07 NOTE — LETTER
7/7/2025      RE: Julissa Contreras  [address] [address] [facility] [address]     Dear Colleague,    Thank you for the opportunity to participate in the care of your patient, Julissa Contreras, at the [facility] [place] at [facility]. Please see a copy of my visit note below.        [facility] [place]  [address]  [facility] [address]  Phone: [phone]  Fax: [phone]    Patient: Julissa Contreras,  preferred name is Julissa, Date of birth 2005  Date of Visit:  7/7/2025,   Referring Provider: Eleonora Saenz  Primary Care Provider: Dr. Eleonora Saenz    Patient Active Problem List   Diagnosis     Other forms of systemic lupus erythematosus (H)     Screening for eye condition     Immunosuppressed status     Lupus nephritis (H)     Family history of clotting disorder     CHILANGO-1 4G/5G genotype          Rheumatology History:   Julissa was first seen in clinic for evaluation of joint pain and stiffness on 3/27/2019. She had signs of inflammatory arthritis of the right hand, malar rash, and erythematous lesions on the hard palate. Multiple laboratory tests were ordered to help clarify a diagnosis, results were as follows: OH positive, double-stranded DNA antibody positive, See antibody positive and mild positive RNP, positive for hematuria.   4/1/19: Discussed the diagnosis of systemic lupus erythematosus. Started mycophenolate 1000 mg twice daily, hydroxychloroquine 400 mg daily and prednisone 40 mg twice daily in addition to IV methylprednisolone daily for 3 days and then weekly for 4 weeks. Started PJP prophylaxis with bactrim.   4/15/19: Significant improvement in symptoms, continued treatment plan.   5/13/19: Lupus clinically and serologically inactive. Continued to wean prednisone.   6/24/19: Continued to wean prednisone with full discontinuation in  July.  8/12/19: Doing well, but hematuria still present as well as slightly higher protein creatinine ratio. Positive rheumatoid factor which was subsequently negative on repeat testing. Complement and double-stranded DNA antibodies remained normal. Continued with treatment plan.   10/14/19: Doing well off steroids. Continued hematuria and slightly higher protein creatinine ratio. Discussed potential addition of a urinary myoglobin to a future urinalysis.  2/17/20: Lupus in good control. Recommended no changes to treatment plan.  5/11/20: Recent renal biopsy showed active lupus nephritis, mixed class III and class V. restarted corticosteroids. Completed further laboratory testing and discussed potential addition of lisinopril to treatment plan.  9/14/2020.  discussed mild anemia but her lupus overall was doing very well  1/4/2021: She had seen endocrinology who obtained testing for bone health and adrenal insufficiency.  No changes lupus medications.   7/12/2021: No changes made to her treatment plan.  I recommended monitoring every 4 months unless her prednisone is weaned to 0 by nephrology in which case I recommend laboratory tests every 2 months  Eye examination: Screening for eye changes related to hydroxychloroquine should begin after 5 years of use which would be starting January 2024.  Then every year thereafter if she has continuous use..  12/27/2021: Prednisone 5 mg every other day with plans for monthly urinalyses and urine albumin.  Increase glucose monitoring to every other month.  6/20/2022: No active lupus features. Prednisone discontinued February 2022 by nephrology. Recommended monthly urine testing.   12/19/2022: No active lupus.  6/17/2024: Doing well at this time.  No changes in treatment.  12/30/2024: Julissa has no concerns today.  She is taking medications as prescribed and has no difficulty with them.  She would like to continue medications and being seen here in rheumatology until she finishes  [facility] for convenience.  I recommend continuing treatment with both mycophenolate and hydroxychloroquine until the summer 2026 though she could continue to treat longer after the after she is transition to adult rheumatology so they can better monitor that timing.           Subjective:   Julissa was last seen in our clinic on 12/30/2024: As above    7/7/2025: She has no concerns today.  She is taking medications with no difficulty.  Tolerating them with no difficulty.  She is already had her laboratory test done for today and some of them have already returned showing normal CBC.  We reviewed her laboratory tests on the computer and discussed her double-stranded DNA and complement at previous times.  We also reviewed urinalysis which is also returned back to normal.    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  She plans to finish her senior year this next year and then travel and live wherever will moved to wherever she can find a job.  This is why we are allowing some flexibility with her transition to adult rheumatology at this time.  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 180 tablet 4     mycophenolate (GENERIC EQUIVALENT) 500 MG tablet Take 2 tablets (1,000 mg) by mouth 2 times daily. 360 tablet 4     cholecalciferol 50 MCG (2000 UT) tablet Take 1 tablet (50 mcg) by mouth daily 60 tablet 0     No current facility-administered medications for this visit.      No current facility-administered medications for this visit.        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  Elevated CHILANGO-1 levels are associated with an increased risk of CAD and VTE     Past Surgical History:   Procedure Laterality Date     HC BIOPSY RENAL, PERCUTANEOUS  3/3/2020          HC TOOTH EXTRACTION W/FORCEP Bilateral 08/2018     PERCUTANEOUS BIOPSY KIDNEY N/A 3/3/2020    Procedure: Percutaneous Native Kidney Biopsy;  Surgeon: Julissa Cornejo MD;  Location: [facility]      Family History   Problem Relation Age of Onset     Nephrolithiasis Mother      Osteoporosis Maternal Grandmother      Hyperlipidemia Maternal Grandmother      Heart Disease Maternal Grandfather      Pancreatic Cancer Paternal Grandmother      Lymphoma Other      Factor V Leiden deficiency Maternal Aunt      Pancreatic Cancer Paternal Great-Grandmother      Lung Cancer Paternal Grandfather      Rheumatic fever Paternal Grandfather      Fibroids Paternal Aunt      Cancer Maternal Great-Grandfather      Kidney Disease No family hx of      Social History     Social History Narrative    She lives at home with mom and dad. Older sister is in college in [place]. No pets.     Plays soccer, basketball, and volleyball.     7/2021She is thinking going to college in the [employer] and is likely going to study computer science or game theory          Examination:   Blood pressure 121/79, pulse 73, height 1.71 m (5' 7.32\"), weight 78.7 kg (173 lb 8 oz), not currently breastfeeding.  Facility age limit for growth %jesus is 20 years.  Growth %ile SmartLinks can only be used for patients less than 20 years old.  Body surface area is 1.93 meters squared.     Constitutional: alert, no distress and cooperative  Head and Eyes: No alopecia, PEERL, conjunctiva clear  ENT: mucous membranes moist, healthy appearing dentition, no intraoral ulcers and no " intranasal ulcers  Neck: Neck supple. No lymphadenopathy. Thyroid symmetric, normal size.  Respiratory: negative, clear to auscultation   Cardiovascular: negative, RRR. No murmurs, no rubs  Gastrointestinal: Abdomen soft, non-tender., No masses, No hepatosplenomegaly  : Deferred  Neurologic: Gait normal.  Sensation grossly normal.  Psychiatric: mentation appears normal and affect normal  Hematologic/Lymphatic/Immunologic: Normal cervical, axillary lymph nodes  Skin: no rashes  Musculoskeletal: gait normal, extremities warm, well perfused. Detailed musculoskeletal exam was performed,         Last Imaging and Laboratory Results:     Results for orders placed or performed during the hospital encounter of 03/03/20   US Guided Needle Placement ([facility] Only)    Narrative    This exam was marked as non-reportable because it will not be read by a   radiologist or a [place] non-radiologist provider.                  No visits with results within 2 Day(s) from this visit.   Latest known visit with results is:   Lab on 12/23/2024   Component Date Value     Sodium 12/23/2024 139      Potassium 12/23/2024 4.1      Carbon Dioxide (CO2) 12/23/2024 25      Anion Gap 12/23/2024 8      Urea Nitrogen 12/23/2024 12.2      Creatinine 12/23/2024 0.86      GFR Estimate 12/23/2024 >90      Calcium 12/23/2024 9.2      Chloride 12/23/2024 106      Glucose 12/23/2024 96      Alkaline Phosphatase 12/23/2024 61      AST 12/23/2024 20      ALT 12/23/2024 15      Protein Total 12/23/2024 6.4      Albumin 12/23/2024 3.9      Bilirubin Total 12/23/2024 0.2      DNA (ds) Antibody 12/23/2024 16.0 (H)      C3 Complement 12/23/2024 94      C4 Complement 12/23/2024 14      Color Urine 12/23/2024 Yellow      Appearance Urine 12/23/2024 Clear      Glucose Urine 12/23/2024 Negative      Bilirubin Urine 12/23/2024 Negative      Ketones Urine 12/23/2024 Negative      Specific Gravity Urine 12/23/2024 >=1.030      Blood Urine 12/23/2024 Negative      pH  Urine 12/23/2024 6.5      Protein Albumin Urine 12/23/2024 Trace (A)      Urobilinogen Urine 12/23/2024 0.2      Nitrite Urine 12/23/2024 Negative      Leukocyte Esterase Urine 12/23/2024 Negative      Total Protein Urine mg/dL 12/23/2024 8.4      Total Protein Urine mg/m* 12/23/2024 0.05      Creatinine Urine mg/dL 12/23/2024 178.0      WBC Count 12/23/2024 6.6      RBC Count 12/23/2024 4.30      Hemoglobin 12/23/2024 12.2      Hematocrit 12/23/2024 39.6      MCV 12/23/2024 92      MCH 12/23/2024 28.4      MCHC 12/23/2024 30.8 (L)      RDW 12/23/2024 12.8      Platelet Count 12/23/2024 206      % Neutrophils 12/23/2024 43      % Lymphocytes 12/23/2024 46      % Monocytes 12/23/2024 8      % Eosinophils 12/23/2024 2      % Basophils 12/23/2024 0      % Immature Granulocytes 12/23/2024 1      Absolute Neutrophils 12/23/2024 2.9      Absolute Lymphocytes 12/23/2024 3.0      Absolute Monocytes 12/23/2024 0.5      Absolute Eosinophils 12/23/2024 0.1      Absolute Basophils 12/23/2024 0.0      Absolute Immature Granul* 12/23/2024 0.0      Bacteria Urine 12/23/2024 Moderate (A)      RBC Urine 12/23/2024 0-2      WBC Urine 12/23/2024 0-5      Squamous Epithelials Uri* 12/23/2024 Few (A)           Assessment :        Other systemic lupus erythematosus with other organ involvement (H)  Screening for eye condition  Immunosuppressed status  Current chronic use of systemic steroids  Vitamin D insufficiency    Julissa has no clinical evidence of active systemic lupus, preliminary laboratory tests already look good.  She is very stable with her medication plan and seems to be tolerating everything well.  I make no changes in her treatment plan at this time.  I will defer to her transition to adult rheumatology prior to discontinuing mycophenolate I think would be better if it was monitored more closely and her next setting.    In addition, she will likely be due for pneumococcal and shingles vaccination as these are newer  recommendations.  Due to the complexity on receiving the correct pneumococcal vaccination I did have her meet with our medical therapeutic management pharmacist so that they can advise her.  She may have these done at her primary care doctor or pharmacist office depending on timing.         Recommendations and Follow-up:     Continue mycophenolate and hydroxychloroquine as prescribed.  Update immunizations as noted for immune suppression    Laboratory, Radiology, Referrals: Testing already performed today will be repeated again in 6 months when she follows up         Orders Placed This Encounter   Procedures     Med Therapy Management Referral     Ophthalmology examination: MREYEFREQ: for hydroxchloroquine use, comprehensive eye exam with visual field and OCT, baseline then every 5 years.     Precautions:   Immune Suppression: Routine care for infections and fevers. For fever illness with rash or an illness requiring emergency department or hospital visit, please call our office for advice. No live vaccinations, such as measles mumps rubella (MMR), varicella chickenpox, and intranasal influenza. Inactivated seasonal influenza and COVID vaccination is recommended as this patient is in the high-risk group for influenza.  Pregnancy: this patient is on medications that can cause pregnancy loss or birth defects. Patient was cautioned to avoid pregnancy, to hold all medications if she thinks she is pregnant and to notify our office immediately.  Sun Exposure: This patient's medication(s) and/or condition make them sun sensitive, causing skin rash or flare of symptoms. Sun avoidance and physical and chemical sunblocks are recommended.   Influenza and COVID: Seasonal vaccination per standard recommendations.     Return visit: 4 months to 6 months, around December at her next school break    If there are any new questions or concerns, I would be glad to help and can be reached through our main office at [phone] or our  paging  at [phone].    Laquita Chavez MD, MS   of Pediatrics  Pediatric Rheumatology  [facility]    Review of the result(s) of each unique test - previous testing  Ordering of each unique test  Prescription drug management  I spent a total of 29 minutes on the day of the visit.   Time spent by me today doing chart review, history and exam, documentation and further activities per the note      The longitudinal plan of care for the diagnosis(es)/condition(s) as documented were addressed during this visit. Due to the added complexity in care, I will continue to support Julissa in the subsequent management and with ongoing continuity of care.    Please do not hesitate to contact me if you have any questions/concerns.     Sincerely,       Laquita Chavez MD

## 2025-07-07 NOTE — PROGRESS NOTES
[facility] [place] [address] [address] [address]  [facility] [address]  Phone: [phone]  Fax: [phone]    Patient: Julissa Contreras,  preferred name is Julissa, Date of birth 2005  Date of Visit:  7/7/2025,   Referring Provider: Eleonora Saenz  Primary Care Provider: Dr. Eleonora Saenz    Patient Active Problem List   Diagnosis    Other forms of systemic lupus erythematosus (H)    Screening for eye condition    Immunosuppressed status    Lupus nephritis (H)    Family history of clotting disorder    CHILANGO-1 4G/5G genotype          Rheumatology History:   Julissa was first seen in clinic for evaluation of joint pain and stiffness on 3/27/2019. She had signs of inflammatory arthritis of the right hand, malar rash, and erythematous lesions on the hard palate. Multiple laboratory tests were ordered to help clarify a diagnosis, results were as follows: OH positive, double-stranded DNA antibody positive, See antibody positive and mild positive RNP, positive for hematuria.   4/1/19: Discussed the diagnosis of systemic lupus erythematosus. Started mycophenolate 1000 mg twice daily, hydroxychloroquine 400 mg daily and prednisone 40 mg twice daily in addition to IV methylprednisolone daily for 3 days and then weekly for 4 weeks. Started PJP prophylaxis with bactrim.   4/15/19: Significant improvement in symptoms, continued treatment plan.   5/13/19: Lupus clinically and serologically inactive. Continued to wean prednisone.   6/24/19: Continued to wean prednisone with full discontinuation in July.  8/12/19: Doing well, but hematuria still present as well as slightly higher protein creatinine ratio. Positive rheumatoid factor which was subsequently negative on repeat testing. Complement and double-stranded DNA antibodies remained normal. Continued with treatment plan.   10/14/19: Doing well off steroids. Continued hematuria and slightly higher protein creatinine  ratio. Discussed potential addition of a urinary myoglobin to a future urinalysis.  2/17/20: Lupus in good control. Recommended no changes to treatment plan.  5/11/20: Recent renal biopsy showed active lupus nephritis, mixed class III and class V. restarted corticosteroids. Completed further laboratory testing and discussed potential addition of lisinopril to treatment plan.  9/14/2020.  discussed mild anemia but her lupus overall was doing very well  1/4/2021: She had seen endocrinology who obtained testing for bone health and adrenal insufficiency.  No changes lupus medications.   7/12/2021: No changes made to her treatment plan.  I recommended monitoring every 4 months unless her prednisone is weaned to 0 by nephrology in which case I recommend laboratory tests every 2 months  Eye examination: Screening for eye changes related to hydroxychloroquine should begin after 5 years of use which would be starting January 2024.  Then every year thereafter if she has continuous use..  12/27/2021: Prednisone 5 mg every other day with plans for monthly urinalyses and urine albumin.  Increase glucose monitoring to every other month.  6/20/2022: No active lupus features. Prednisone discontinued February 2022 by nephrology. Recommended monthly urine testing.   12/19/2022: No active lupus.  6/17/2024: Doing well at this time.  No changes in treatment.  12/30/2024: Julissa has no concerns today.  She is taking medications as prescribed and has no difficulty with them.  She would like to continue medications and being seen here in rheumatology until she finishes college for convenience.  I recommend continuing treatment with both mycophenolate and hydroxychloroquine until the summer 2026 though she could continue to treat longer after the after she is transition to adult rheumatology so they can better monitor that timing.           Subjective:   Julissa was last seen in our clinic on 12/30/2024: As above    7/7/2025: She has no  concerns today.  She is taking medications with no difficulty.  Tolerating them with no difficulty.  She is already had her laboratory test done for today and some of them have already returned showing normal CBC.  We reviewed her laboratory tests on the computer and discussed her double-stranded DNA and complement at previous times.  We also reviewed urinalysis which is also returned back to normal.    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  She plans to finish her senior year this next year and then travel and live wherever will moved to wherever she can find a job.  This is why we are allowing some flexibility with her transition to adult rheumatology at this time.  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 180 tablet 4    mycophenolate (GENERIC EQUIVALENT) 500 MG tablet Take 2 tablets (1,000 mg) by mouth 2 times daily. 360 tablet 4    cholecalciferol 50 MCG (2000 UT) tablet Take 1 tablet (50 mcg) by mouth daily 60 tablet 0     No current facility-administered medications for this visit.      No current facility-administered medications for this visit.        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  Elevated CHILANGO-1 levels are associated with an increased risk of CAD and VTE     Past Surgical History:   Procedure Laterality Date    HC BIOPSY RENAL, PERCUTANEOUS   "3/3/2020         HC TOOTH EXTRACTION W/FORCEP Bilateral 08/2018    PERCUTANEOUS BIOPSY KIDNEY N/A 3/3/2020    Procedure: Percutaneous Native Kidney Biopsy;  Surgeon: Julissa Cornejo MD;  Location: [facility]      Family History   Problem Relation Age of Onset    Nephrolithiasis Mother     Osteoporosis Maternal Grandmother     Hyperlipidemia Maternal Grandmother     Heart Disease Maternal Grandfather     Pancreatic Cancer Paternal Grandmother     Lymphoma Other     Factor V Leiden deficiency Maternal Aunt     Pancreatic Cancer Paternal Great-Grandmother     Lung Cancer Paternal Grandfather     Rheumatic fever Paternal Grandfather     Fibroids Paternal Aunt     Cancer Maternal Great-Grandfather     Kidney Disease No family hx of      Social History     Social History Narrative    She lives at home with mom and dad. Older sister is in college in [place]. No pets.     Plays soccer, basketball, and volleyball.     7/2021She is thinking going to college in the [employer] and is likely going to study computer science or game theory          Examination:   Blood pressure 121/79, pulse 73, height 1.71 m (5' 7.32\"), weight 78.7 kg (173 lb 8 oz), not currently breastfeeding.  Facility age limit for growth %jesus is 20 years.  Growth %ile SmartLinks can only be used for patients less than 20 years old.  Body surface area is 1.93 meters squared.     Constitutional: alert, no distress and cooperative  Head and Eyes: No alopecia, PEERL, conjunctiva clear  ENT: mucous membranes moist, healthy appearing dentition, no intraoral ulcers and no intranasal ulcers  Neck: Neck supple. No lymphadenopathy. Thyroid symmetric, normal size.  Respiratory: negative, clear to auscultation   Cardiovascular: negative, RRR. No murmurs, no rubs  Gastrointestinal: Abdomen soft, non-tender., No masses, No hepatosplenomegaly  : Deferred  Neurologic: Gait normal.  Sensation grossly normal.  Psychiatric: mentation appears normal and affect " normal  Hematologic/Lymphatic/Immunologic: Normal cervical, axillary lymph nodes  Skin: no rashes  Musculoskeletal: gait normal, extremities warm, well perfused. Detailed musculoskeletal exam was performed,         Last Imaging and Laboratory Results:     Results for orders placed or performed during the hospital encounter of 03/03/20   US Guided Needle Placement ([facility] Only)    Narrative    This exam was marked as non-reportable because it will not be read by a   radiologist or a [facility] non-radiologist provider.                  No visits with results within 2 Day(s) from this visit.   Latest known visit with results is:   Lab on 12/23/2024   Component Date Value    Sodium 12/23/2024 139     Potassium 12/23/2024 4.1     Carbon Dioxide (CO2) 12/23/2024 25     Anion Gap 12/23/2024 8     Urea Nitrogen 12/23/2024 12.2     Creatinine 12/23/2024 0.86     GFR Estimate 12/23/2024 >90     Calcium 12/23/2024 9.2     Chloride 12/23/2024 106     Glucose 12/23/2024 96     Alkaline Phosphatase 12/23/2024 61     AST 12/23/2024 20     ALT 12/23/2024 15     Protein Total 12/23/2024 6.4     Albumin 12/23/2024 3.9     Bilirubin Total 12/23/2024 0.2     DNA (ds) Antibody 12/23/2024 16.0 (H)     C3 Complement 12/23/2024 94     C4 Complement 12/23/2024 14     Color Urine 12/23/2024 Yellow     Appearance Urine 12/23/2024 Clear     Glucose Urine 12/23/2024 Negative     Bilirubin Urine 12/23/2024 Negative     Ketones Urine 12/23/2024 Negative     Specific Gravity Urine 12/23/2024 >=1.030     Blood Urine 12/23/2024 Negative     pH Urine 12/23/2024 6.5     Protein Albumin Urine 12/23/2024 Trace (A)     Urobilinogen Urine 12/23/2024 0.2     Nitrite Urine 12/23/2024 Negative     Leukocyte Esterase Urine 12/23/2024 Negative     Total Protein Urine mg/dL 12/23/2024 8.4     Total Protein Urine mg/m* 12/23/2024 0.05     Creatinine Urine mg/dL 12/23/2024 178.0     WBC Count 12/23/2024 6.6     RBC Count 12/23/2024 4.30     Hemoglobin  12/23/2024 12.2     Hematocrit 12/23/2024 39.6     MCV 12/23/2024 92     MCH 12/23/2024 28.4     MCHC 12/23/2024 30.8 (L)     RDW 12/23/2024 12.8     Platelet Count 12/23/2024 206     % Neutrophils 12/23/2024 43     % Lymphocytes 12/23/2024 46     % Monocytes 12/23/2024 8     % Eosinophils 12/23/2024 2     % Basophils 12/23/2024 0     % Immature Granulocytes 12/23/2024 1     Absolute Neutrophils 12/23/2024 2.9     Absolute Lymphocytes 12/23/2024 3.0     Absolute Monocytes 12/23/2024 0.5     Absolute Eosinophils 12/23/2024 0.1     Absolute Basophils 12/23/2024 0.0     Absolute Immature Granul* 12/23/2024 0.0     Bacteria Urine 12/23/2024 Moderate (A)     RBC Urine 12/23/2024 0-2     WBC Urine 12/23/2024 0-5     Squamous Epithelials Uri* 12/23/2024 Few (A)           Assessment :        Other systemic lupus erythematosus with other organ involvement (H)  Screening for eye condition  Immunosuppressed status  Current chronic use of systemic steroids  Vitamin D insufficiency    Julissa has no clinical evidence of active systemic lupus, preliminary laboratory tests already look good.  She is very stable with her medication plan and seems to be tolerating everything well.  I make no changes in her treatment plan at this time.  I will defer to her transition to adult rheumatology prior to discontinuing mycophenolate I think would be better if it was monitored more closely and her next setting.    In addition, she will likely be due for pneumococcal and shingles vaccination as these are newer recommendations.  Due to the complexity on receiving the correct pneumococcal vaccination I did have her meet with our medical therapeutic management pharmacist so that they can advise her.  She may have these done at her primary care doctor or pharmacist office depending on timing.         Recommendations and Follow-up:     Continue mycophenolate and hydroxychloroquine as prescribed.  Update immunizations as noted for immune  suppression    Laboratory, Radiology, Referrals: Testing already performed today will be repeated again in 6 months when she follows up         Orders Placed This Encounter   Procedures    Med Therapy Management Referral     Ophthalmology examination: MREYEFREQ: for hydroxchloroquine use, comprehensive eye exam with visual field and OCT, baseline then every 5 years.     Precautions:   Immune Suppression: Routine care for infections and fevers. For fever illness with rash or an illness requiring emergency department or hospital visit, please call our office for advice. No live vaccinations, such as measles mumps rubella (MMR), varicella chickenpox, and intranasal influenza. Inactivated seasonal influenza and COVID vaccination is recommended as this patient is in the high-risk group for influenza.  Pregnancy: this patient is on medications that can cause pregnancy loss or birth defects. Patient was cautioned to avoid pregnancy, to hold all medications if she thinks she is pregnant and to notify our office immediately.  Sun Exposure: This patient's medication(s) and/or condition make them sun sensitive, causing skin rash or flare of symptoms. Sun avoidance and physical and chemical sunblocks are recommended.   Influenza and COVID: Seasonal vaccination per standard recommendations.     Return visit: 4 months to 6 months, around December at her next school break    If there are any new questions or concerns, I would be glad to help and can be reached through our main office at [phone] or our paging  at [phone].    Laquita Chavez MD, MS   of Pediatrics  Pediatric Rheumatology  [facility]    Review of the result(s) of each unique test - previous testing  Ordering of each unique test  Prescription drug management  I spent a total of 29 minutes on the day of the visit.   Time spent by me today doing chart review, history and exam, documentation  and further activities per the note      The longitudinal plan of care for the diagnosis(es)/condition(s) as documented were addressed during this visit. Due to the added complexity in care, I will continue to support Julissa in the subsequent management and with ongoing continuity of care.

## 2025-07-08 ENCOUNTER — OFFICE VISIT (OUTPATIENT)
Dept: PEDIATRICS | Facility: CLINIC | Age: 20
End: 2025-07-08
Payer: COMMERCIAL

## 2025-07-08 VITALS
WEIGHT: 170.4 LBS | BODY MASS INDEX: 25.82 KG/M2 | HEIGHT: 68 IN | OXYGEN SATURATION: 99 % | DIASTOLIC BLOOD PRESSURE: 74 MMHG | SYSTOLIC BLOOD PRESSURE: 115 MMHG | RESPIRATION RATE: 20 BRPM | TEMPERATURE: 98.1 F | HEART RATE: 65 BPM

## 2025-07-08 DIAGNOSIS — Z12.4 CERVICAL CANCER SCREENING: ICD-10-CM

## 2025-07-08 DIAGNOSIS — Z00.00 ROUTINE GENERAL MEDICAL EXAMINATION AT A HEALTH CARE FACILITY: Primary | ICD-10-CM

## 2025-07-08 DIAGNOSIS — M06.4 INFLAMMATORY POLYARTHROPATHY (H): ICD-10-CM

## 2025-07-08 DIAGNOSIS — M32.9 SYSTEMIC LUPUS ERYTHEMATOSUS, UNSPECIFIED SLE TYPE, UNSPECIFIED ORGAN INVOLVEMENT STATUS (H): ICD-10-CM

## 2025-07-08 DIAGNOSIS — M32.19 OTHER SYSTEMIC LUPUS ERYTHEMATOSUS WITH OTHER ORGAN INVOLVEMENT (H): ICD-10-CM

## 2025-07-08 DIAGNOSIS — M32.14 LUPUS NEPHRITIS (H): ICD-10-CM

## 2025-07-08 PROBLEM — M32.8 OTHER FORMS OF SYSTEMIC LUPUS ERYTHEMATOSUS (H): Chronic | Status: RESOLVED | Noted: 2019-03-25 | Resolved: 2025-07-08

## 2025-07-08 LAB
C3 SERPL-MCNC: 102 MG/DL (ref 81–157)
C4 SERPL-MCNC: 15 MG/DL (ref 13–39)

## 2025-07-08 PROCEDURE — 87591 N.GONORRHOEAE DNA AMP PROB: CPT | Performed by: PEDIATRICS

## 2025-07-08 PROCEDURE — 87491 CHLMYD TRACH DNA AMP PROBE: CPT | Performed by: PEDIATRICS

## 2025-07-08 PROCEDURE — 99395 PREV VISIT EST AGE 18-39: CPT | Performed by: PEDIATRICS

## 2025-07-08 PROCEDURE — 3074F SYST BP LT 130 MM HG: CPT | Performed by: PEDIATRICS

## 2025-07-08 PROCEDURE — 3078F DIAST BP <80 MM HG: CPT | Performed by: PEDIATRICS

## 2025-07-08 SDOH — HEALTH STABILITY: PHYSICAL HEALTH: ON AVERAGE, HOW MANY MINUTES DO YOU ENGAGE IN EXERCISE AT THIS LEVEL?: 60 MIN

## 2025-07-08 SDOH — HEALTH STABILITY: PHYSICAL HEALTH: ON AVERAGE, HOW MANY DAYS PER WEEK DO YOU ENGAGE IN MODERATE TO STRENUOUS EXERCISE (LIKE A BRISK WALK)?: 5 DAYS

## 2025-07-08 ASSESSMENT — SOCIAL DETERMINANTS OF HEALTH (SDOH): HOW OFTEN DO YOU GET TOGETHER WITH FRIENDS OR RELATIVES?: MORE THAN THREE TIMES A WEEK

## 2025-07-08 NOTE — PATIENT INSTRUCTIONS
Patient Education   Preventive Care Advice   This is general advice given by our system to help you stay healthy. However, your care team may have specific advice just for you. Please talk to your care team about your preventive care needs.  Nutrition  Eat 5 or more servings of fruits and vegetables each day.  Try wheat bread, brown rice and whole grain pasta (instead of white bread, rice, and pasta).  Get enough calcium and vitamin D. Check the label on foods and aim for 100% of the RDA (recommended daily allowance).  Lifestyle  Exercise at least 150 minutes each week  (30 minutes a day, 5 days a week).  Do muscle strengthening activities 2 days a week. These help control your weight and prevent disease.  No smoking.  Wear sunscreen to prevent skin cancer.  Have a dental exam and cleaning every 6 months.  Yearly exams  See your health care team every year to talk about:  Any changes in your health.  Any medicines your care team has prescribed.  Preventive care, family planning, and ways to prevent chronic diseases.  Shots (vaccines)   HPV shots (up to age 26), if you've never had them before.  Hepatitis B shots (up to age 59), if you've never had them before.  COVID-19 shot: Get this shot when it's due.  Flu shot: Get a flu shot every year.  Tetanus shot: Get a tetanus shot every 10 years.  Pneumococcal, hepatitis A, and RSV shots: Ask your care team if you need these based on your risk.  Shingles shot (for age 50 and up)  General health tests  Diabetes screening:  Starting at age 35, Get screened for diabetes at least every 3 years.  If you are younger than age 35, ask your care team if you should be screened for diabetes.  Cholesterol test: At age 39, start having a cholesterol test every 5 years, or more often if advised.  Bone density scan (DEXA): At age 50, ask your care team if you should have this scan for osteoporosis (brittle bones).  Hepatitis C: Get tested at least once in your life.  STIs (sexually  transmitted infections)  Before age 24: Ask your care team if you should be screened for STIs.  After age 24: Get screened for STIs if you're at risk. You are at risk for STIs (including HIV) if:  You are sexually active with more than one person.  You don't use condoms every time.  You or a partner was diagnosed with a sexually transmitted infection.  If you are at risk for HIV, ask about PrEP medicine to prevent HIV.  Get tested for HIV at least once in your life, whether you are at risk for HIV or not.  Cancer screening tests  Cervical cancer screening: If you have a cervix, begin getting regular cervical cancer screening tests starting at age 21.  Breast cancer scan (mammogram): If you've ever had breasts, begin having regular mammograms starting at age 40. This is a scan to check for breast cancer.  Colon cancer screening: It is important to start screening for colon cancer at age 45.  Have a colonoscopy test every 10 years (or more often if you're at risk) Or, ask your provider about stool tests like a FIT test every year or Cologuard test every 3 years.  To learn more about your testing options, visit:   .  For help making a decision, visit:   https://bit.ly/og47492.  Prostate cancer screening test: If you have a prostate, ask your care team if a prostate cancer screening test (PSA) at age 55 is right for you.  Lung cancer screening: If you are a current or former smoker ages 50 to 80, ask your care team if ongoing lung cancer screenings are right for you.  For informational purposes only. Not to replace the advice of your health care provider. Copyright   2023 [employer]. All rights reserved. Clinically reviewed by the [facility]. icomasoft [id number] - REV 01/24.

## 2025-07-08 NOTE — PROGRESS NOTES
"Preventive Care Visit  [employer] [employer] DELILAH Saenz MD, Internal Medicine - Pediatrics  Jul 8, 2025      Assessment & Plan       ICD-10-CM    1. Routine general medical examination at a health care facility  Z00.00 PRIMARY CARE FOLLOW-UP SCHEDULING     Pap Screen Only - Recommended Age 21 - 24 Years     NEISSERIA GONORRHOEA PCR     CHLAMYDIA TRACHOMATIS PCR    Labs done yesterday      2. Inflammatory polyarthropathy (H)  M06.4 Stable disease, follows with Dr Chavez - no new concerns      3. Lupus nephritis (H)  M32.14 Quiescent, follow      4. Systemic lupus erythematosus, unspecified SLE type, unspecified organ involvement status (H)  M32.9 Stable on current immunosuppression      5. Other systemic lupus erythematosus with other organ involvement (H)  M32.19       6. Cervical cancer screening  Z12.4 Pap Screen Only - Recommended Age 21 - 24 Years                    BMI  Estimated body mass index is 26.13 kg/m  as calculated from the following:    Height as of this encounter: 1.72 m (5' 7.72\").    Weight as of this encounter: 77.3 kg (170 lb 6.4 oz).   Weight management plan: Discussed healthy diet and exercise guidelines  Reviewed preventive health counseling, as reflected in patient instructions  Counseling  Appropriate preventive services were addressed with this patient via screening, questionnaire, or discussion as appropriate for fall prevention, nutrition, physical activity, Tobacco-use cessation, social engagement, weight loss and cognition.  Checklist reviewing preventive services available has been given to the patient.  Reviewed patient's diet, addressing concerns and/or questions.             Frankie Costa is a 20 year old, presenting for the following:  Physical        7/8/2025    10:53 AM   Additional Questions   Roomed by miss   Accompanied by self         7/8/2025    10:53 AM   Patient Reported Additional Medications   Patient reports taking the following new " medications no          HPI    Advance Care Planning    Discussed advance care planning with patient; informed AVS has link to [facility].        7/8/2025   General Health   How would you rate your overall physical health? Excellent   Feel stress (tense, anxious, or unable to sleep) Not at all         7/8/2025   Nutrition   Three or more servings of calcium each day? Yes   Diet: Regular (no restrictions)   How many servings of fruit and vegetables per day? (!) 0-1   How many sweetened beverages each day? 0-1         7/8/2025   Exercise   Days per week of moderate/strenous exercise 5 days   Average minutes spent exercising at this level 60 min         7/8/2025   Social Factors   Frequency of gathering with friends or relatives More than three times a week   Worry food won't last until get money to buy more No   Food not last or not have enough money for food? No   Do you have housing? (Housing is defined as stable permanent housing and does not include staying outside in a car, in a tent, in an abandoned building, in an overnight shelter, or couch-surfing.) Yes   Are you worried about losing your housing? No   Lack of transportation? No   Unable to get utilities (heat,electricity)? No         7/8/2025   Dental   Dentist two times every year? Yes         Today's PHQ-2 Score:       7/8/2025    10:48 AM   PHQ-2 ( 1999 [employer])   Q1: Little interest or pleasure in doing things 0   Q2: Feeling down, depressed or hopeless 0   PHQ-2 Score 0    Q1: Little interest or pleasure in doing things Not at all   Q2: Feeling down, depressed or hopeless Not at all   PHQ-2 Score 0       Patient-reported           7/8/2025   Substance Use   Alcohol more than 3/day or more than 7/wk No   Do you use any other substances recreationally? No     Social History     Tobacco Use    Smoking status: Never    Smokeless tobacco: Never   Vaping Use    Vaping status: Never Used   Substance Use Topics    Alcohol use: Never    Drug use: Never  "          7/8/2025   STI Screening   New sexual partner(s) since last STI/HIV test? (!) YES - 1 new partner     History of abnormal Pap smear: No - age 21-29 PAP every 3 years recommended             7/8/2025   Contraception/Family Planning   Questions about contraception or family planning No   What are your periods like? (!) HEAVY FLOW        Reviewed and updated as needed this visit by Provider                    SLE, Lupus nephritis - follows with Dr Chavez - had visit yesterday.  No acute changes to immunosuppression.   Labs collected yesterday.    [employer] - has upcoming trip to train planned!    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       Objective    Exam  /74 (BP Location: Right arm, Patient Position: Sitting, Cuff Size: Adult Regular)   Pulse 65   Temp 98.1  F (36.7  C) (Temporal)   Resp 20   Ht 1.72 m (5' 7.72\")   Wt 77.3 kg (170 lb 6.4 oz)   LMP 06/30/2025 (Exact Date)   SpO2 99%   BMI 26.13 kg/m     Estimated body mass index is 26.13 kg/m  as calculated from the following:    Height as of this encounter: 1.72 m (5' 7.72\").    Weight as of this encounter: 77.3 kg (170 lb 6.4 oz).  Wt Readings from Last 4 Encounters:   07/08/25 77.3 kg (170 lb 6.4 oz)   07/07/25 78.7 kg (173 lb 8 oz)   12/30/24 83.6 kg (184 lb 4.9 oz)   06/25/24 80.2 kg (176 lb 11.2 oz)         Physical Exam  GENERAL: alert and no distress  EYES: Eyes grossly normal to inspection, PERRL and conjunctivae and sclerae normal  HENT: ear canals and TM's normal, nose and mouth without ulcers or lesions  NECK: no adenopathy, no asymmetry, masses, or scars  RESP: lungs clear to auscultation - no rales, rhonchi or wheezes  CV: regular rate and rhythm, normal S1 S2, no S3 or S4, no murmur, click or rub, no peripheral edema  ABDOMEN: soft, nontender, no hepatosplenomegaly, no masses and bowel sounds normal   (female) " w/bimanual: normal female external genitalia, normal urethral meatus, normal vaginal mucosa, and normal cervix/adnexa/uterus without masses or discharge  MS: no gross musculoskeletal defects noted, no edema  SKIN: no suspicious lesions or rashes  NEURO: Normal strength and tone, mentation intact and speech normal  PSYCH: mentation appears normal, affect normal/bright        Signed Electronically by: Eleonora Saenz MD

## 2025-07-09 LAB
C TRACH DNA SPEC QL NAA+PROBE: NEGATIVE
DSDNA AB SER-ACNC: 20 IU/ML
N GONORRHOEA DNA SPEC QL NAA+PROBE: NEGATIVE
SPECIMEN TYPE: NORMAL
SPECIMEN TYPE: NORMAL

## 2025-07-10 ENCOUNTER — VIRTUAL VISIT (OUTPATIENT)
Dept: PHARMACY | Facility: CLINIC | Age: 20
End: 2025-07-10
Attending: PEDIATRICS
Payer: COMMERCIAL

## 2025-07-10 DIAGNOSIS — M32.19 OTHER SYSTEMIC LUPUS ERYTHEMATOSUS WITH OTHER ORGAN INVOLVEMENT (H): ICD-10-CM

## 2025-07-10 DIAGNOSIS — D84.9 IMMUNOSUPPRESSED STATUS: Primary | ICD-10-CM

## 2025-07-10 NOTE — PROGRESS NOTES
Medication Therapy Management (MTM) Encounter    ASSESSMENT:                            Medication Adherence/Access: No issues identified.    Systemic Lupus Erythematosus (SLE)  Julissa is improved on therapy with mycophenolate and hydroxychloroquine and would benefit from continuation. No troubles with administration and they are tolerating the medication well without concern for side effects. Provided education on Shingrix and pneumococcal vaccine recommendations. Received Prevnar-20 in 2023 and is up to date, recommended Shingrix vaccine and process for receiving. Julissa requests that the prescription be sent to [facility] near school in [place] and she will go in and get it next week.    PLAN:                            MTM pharmacist to send prescription for Shingrix vaccine to [facility] in [employer] in [place], [place].  2 dose series  by 2 months.  Continue hydroxychloroquine 400 mg once daily.  Continue mycophenolate 1000 mg twice daily.  Routine lab monitoring per the guidance of Dr. Chavez while on medication therapy.    Follow-up: with Dr. Chavez 12/22/2025, with me in around 5 months for follow-up and [facility].    SUBJECTIVE/OBJECTIVE:                          Julissa Contreras is a 20 year old female seen for an initial visit. She was referred to me from Dr. Chavez.      Reason for visit: Vaccine information.    Allergies/ADRs: Reviewed in chart and None  Past Medical History: Reviewed in chart  Tobacco: She reports that she has never smoked. She has never used smokeless tobacco.  Alcohol: Reviewed in chart    Medication Adherence/Access: no issues reported.    Systemic Lupus Erythematosus (SLE)  - Hydroxychloroquine 400 mg once daily   - Mycophenolate 1000 mg twice daily     Side effects: none reported.    Patient reports doing well on medication therapy. Has no concerns and feels that things are going well. Has not had active lupus for a few years at this point. Is going back to school in  [place] on Monday. Is interested in learning more about vaccines and what she would be eligible. Wondering about the rationale behind them and what the process looks like for getting them.    Specialist: Dr. Laquita Chavez MD, Pediatric Rheumatology. Last visit on 7/7/2025.     Pre-Biologic Screening:   Hep B Core Antibody  Non-reactive (4/1/2019)    Hep C Antibody  Non-reactive (4/1/2019)    Quantiferon TB Gold Negative (4/1/2019)      Last lab monitoring completed: 7/7/2025    Immunization History   Pneumococcal  Prevnar-20: 6/22/2023 Up-to-date   Tetanus/Tdap  Up-to-date   Shingrix Eligible to receive    All patients on biologics should avoid live vaccines (varicella/VZV, intranasal influenza, MMR, or yellow fever vaccine (if traveling))         Today's Vitals: LMP 06/30/2025 (Exact Date)   ----------------    I spent 23 minutes with this patient today. All changes were made via collaborative practice agreement with Laquita Chavez.     A summary of these recommendations was sent via [facility].    Lexi Sultana, PharmD  Medication Therapy Management Pharmacist  [facility] Pediatric Rheumatology - Dermatology  Phone: [phone]    Telemedicine Visit Details  The patient's medications can be safely assessed via a telemedicine encounter.  Type of service:  Telephone visit  Originating Location (pt. Location): Home    Distant Location (provider location):  Off-site  Start Time: 10:00 AM  End Time: 10:23 AM     Medication Therapy Recommendations  No medication therapy recommendations to display

## 2025-07-10 NOTE — PATIENT INSTRUCTIONS
"Recommendations from today's MTM visit:                                                      MTM pharmacist to send prescription for Shingrix vaccine to [facility] in [facility] in [place], [place].  2 dose series  by 2 months.  Continue hydroxychloroquine 400 mg once daily.  Continue mycophenolate 1000 mg twice daily.  Routine lab monitoring per the guidance of Dr. Chavez while on medication therapy.    Follow-up: with Dr. Chavez 12/22/2025, with me in around 5 months for follow-up and [facility].    It was great speaking with you today.  I value your experience and would be very thankful for your time in providing feedback in our clinic survey. In the next few days, you may receive an email or text message from [facility] with a link to a survey related to your  clinical pharmacist.\"     To schedule another MTM appointment, please call the clinic directly or you may call the MTM scheduling line at [phone].    My Clinical Pharmacist's contact information:                                                      Please feel free to contact me with any questions or concerns you have.      Lexi Sultana, PharmD  Medication Therapy Management Pharmacist  [facility] Pediatric Rheumatology - Dermatology  Phone: [phone]   "

## (undated) DEVICE — DRSG PRIMAPORE 02X3" 7133

## (undated) DEVICE — PEN MARKING SKIN FINE 31145942

## (undated) DEVICE — GLOVE PROTEXIS W/NEU-THERA 6.0  2D73TE60

## (undated) DEVICE — NDL BIOPSY PROSTATE TRU-CORE 14GAX16CM 763114160X

## (undated) DEVICE — SYR 10ML PREFILLED 0.9% NACL INJ NOT STERILE 306500

## (undated) DEVICE — DRSG TELFA 3X8" 1238

## (undated) DEVICE — BLADE KNIFE SURG 11 WITH HANDLE 4-411

## (undated) DEVICE — SYR 10ML LL W/O NDL

## (undated) DEVICE — COVER TRANSDUCER PROBE 7X24" 610-575

## (undated) RX ORDER — PROPOFOL 10 MG/ML
INJECTION, EMULSION INTRAVENOUS
Status: DISPENSED
Start: 2020-03-03

## (undated) RX ORDER — FENTANYL CITRATE 50 UG/ML
INJECTION, SOLUTION INTRAMUSCULAR; INTRAVENOUS
Status: DISPENSED
Start: 2020-03-03